# Patient Record
Sex: FEMALE | Race: WHITE | NOT HISPANIC OR LATINO | Employment: OTHER | URBAN - METROPOLITAN AREA
[De-identification: names, ages, dates, MRNs, and addresses within clinical notes are randomized per-mention and may not be internally consistent; named-entity substitution may affect disease eponyms.]

---

## 2017-12-05 ENCOUNTER — TRANSCRIBE ORDERS (OUTPATIENT)
Dept: ADMINISTRATIVE | Facility: HOSPITAL | Age: 62
End: 2017-12-05

## 2017-12-05 DIAGNOSIS — Z12.31 VISIT FOR SCREENING MAMMOGRAM: Primary | ICD-10-CM

## 2017-12-20 ENCOUNTER — APPOINTMENT (OUTPATIENT)
Dept: MAMMOGRAPHY | Facility: HOSPITAL | Age: 62
End: 2017-12-20
Attending: OBSTETRICS & GYNECOLOGY

## 2020-08-16 ENCOUNTER — APPOINTMENT (OUTPATIENT)
Dept: PREADMISSION TESTING | Facility: HOSPITAL | Age: 65
End: 2020-08-16

## 2020-09-29 ENCOUNTER — OFFICE (OUTPATIENT)
Dept: URBAN - METROPOLITAN AREA CLINIC 4 | Facility: CLINIC | Age: 65
End: 2020-09-29

## 2020-09-29 DIAGNOSIS — K21.9 GASTRO-ESOPHAGEAL REFLUX DISEASE WITHOUT ESOPHAGITIS: ICD-10-CM

## 2020-09-29 DIAGNOSIS — R14.0 ABDOMINAL DISTENSION (GASEOUS): ICD-10-CM

## 2020-09-29 DIAGNOSIS — K59.02 OUTLET DYSFUNCTION CONSTIPATION: ICD-10-CM

## 2020-09-29 DIAGNOSIS — K30 FUNCTIONAL DYSPEPSIA: ICD-10-CM

## 2020-09-29 DIAGNOSIS — R15.0 INCOMPLETE DEFECATION: ICD-10-CM

## 2020-09-29 PROCEDURE — 99203 OFFICE O/P NEW LOW 30 MIN: CPT | Performed by: INTERNAL MEDICINE

## 2020-12-21 ENCOUNTER — TRANSCRIBE ORDERS (OUTPATIENT)
Dept: ADMINISTRATIVE | Facility: HOSPITAL | Age: 65
End: 2020-12-21

## 2020-12-21 DIAGNOSIS — Z12.31 VISIT FOR SCREENING MAMMOGRAM: Primary | ICD-10-CM

## 2020-12-28 ENCOUNTER — APPOINTMENT (OUTPATIENT)
Dept: MAMMOGRAPHY | Facility: HOSPITAL | Age: 65
End: 2020-12-28

## 2021-02-04 ENCOUNTER — TELEHEALTH PROVIDED OTHER THAN IN PATIENT'S HOME (OUTPATIENT)
Dept: URBAN - METROPOLITAN AREA TELEHEALTH 1 | Facility: TELEHEALTH | Age: 66
End: 2021-02-04

## 2021-02-04 VITALS — HEIGHT: 64 IN | WEIGHT: 204 LBS

## 2021-02-04 DIAGNOSIS — R15.0 INCOMPLETE DEFECATION: ICD-10-CM

## 2021-02-04 DIAGNOSIS — K59.02 OUTLET DYSFUNCTION CONSTIPATION: ICD-10-CM

## 2021-02-04 DIAGNOSIS — R14.0 ABDOMINAL DISTENSION (GASEOUS): ICD-10-CM

## 2021-02-04 DIAGNOSIS — K30 FUNCTIONAL DYSPEPSIA: ICD-10-CM

## 2021-02-04 PROCEDURE — 99214 OFFICE O/P EST MOD 30 MIN: CPT | Mod: 95 | Performed by: NURSE PRACTITIONER

## 2021-02-04 RX ORDER — PSYLLIUM HUSK 3.4 G/12G
3.4 GRANULE ORAL
Qty: 1 | Refills: 11 | Status: ACTIVE

## 2021-02-04 RX ORDER — OMEPRAZOLE 20 MG/1
20 CAPSULE, DELAYED RELEASE ORAL
Qty: 30 | Refills: 11 | Status: ACTIVE

## 2021-02-04 RX ORDER — POLYETHYLENE GLYCOL 3350 17 G/17G
17 POWDER, FOR SOLUTION ORAL
Qty: 1 | Refills: 11 | Status: ACTIVE

## 2022-03-21 ENCOUNTER — APPOINTMENT (OUTPATIENT)
Dept: CT IMAGING | Facility: HOSPITAL | Age: 67
End: 2022-03-21

## 2022-03-21 ENCOUNTER — APPOINTMENT (OUTPATIENT)
Dept: GENERAL RADIOLOGY | Facility: HOSPITAL | Age: 67
End: 2022-03-21

## 2022-03-21 ENCOUNTER — HOSPITAL ENCOUNTER (EMERGENCY)
Facility: HOSPITAL | Age: 67
Discharge: HOME | End: 2022-03-21
Attending: EMERGENCY MEDICINE | Admitting: EMERGENCY MEDICINE

## 2022-03-21 VITALS
HEART RATE: 63 BPM | SYSTOLIC BLOOD PRESSURE: 140 MMHG | RESPIRATION RATE: 19 BRPM | WEIGHT: 206 LBS | TEMPERATURE: 97.9 F | HEIGHT: 64 IN | OXYGEN SATURATION: 96 % | BODY MASS INDEX: 35.17 KG/M2 | DIASTOLIC BLOOD PRESSURE: 71 MMHG

## 2022-03-21 DIAGNOSIS — R07.89 CHEST TIGHTNESS: ICD-10-CM

## 2022-03-21 DIAGNOSIS — R94.31 ABNORMAL EKG: ICD-10-CM

## 2022-03-21 DIAGNOSIS — R06.02 EXERTIONAL SHORTNESS OF BREATH: ICD-10-CM

## 2022-03-21 DIAGNOSIS — I49.3 PVC'S (PREMATURE VENTRICULAR CONTRACTIONS): ICD-10-CM

## 2022-03-21 DIAGNOSIS — R00.2 PALPITATIONS: Primary | ICD-10-CM

## 2022-03-21 LAB
ALBUMIN SERPL-MCNC: 4.8 G/DL (ref 3.5–5.2)
ALBUMIN/GLOB SERPL: 1.5 G/DL
ALP SERPL-CCNC: 116 U/L (ref 39–117)
ALT SERPL W P-5'-P-CCNC: 27 U/L (ref 1–33)
ANION GAP SERPL CALCULATED.3IONS-SCNC: 13 MMOL/L (ref 5–15)
AST SERPL-CCNC: 27 U/L (ref 1–32)
BASOPHILS # BLD AUTO: 0.12 10*3/MM3 (ref 0–0.2)
BASOPHILS NFR BLD AUTO: 1.1 % (ref 0–1.5)
BILIRUB SERPL-MCNC: 0.5 MG/DL (ref 0–1.2)
BUN SERPL-MCNC: 11 MG/DL (ref 8–23)
BUN/CREAT SERPL: 14.1 (ref 7–25)
CALCIUM SPEC-SCNC: 9.5 MG/DL (ref 8.6–10.5)
CHLORIDE SERPL-SCNC: 103 MMOL/L (ref 98–107)
CO2 SERPL-SCNC: 24 MMOL/L (ref 22–29)
CREAT SERPL-MCNC: 0.78 MG/DL (ref 0.57–1)
DEPRECATED RDW RBC AUTO: 42.7 FL (ref 37–54)
EGFRCR SERPLBLD CKD-EPI 2021: 83.9 ML/MIN/1.73
EOSINOPHIL # BLD AUTO: 0.16 10*3/MM3 (ref 0–0.4)
EOSINOPHIL NFR BLD AUTO: 1.4 % (ref 0.3–6.2)
ERYTHROCYTE [DISTWIDTH] IN BLOOD BY AUTOMATED COUNT: 13.7 % (ref 12.3–15.4)
FLUAV SUBTYP SPEC NAA+PROBE: NOT DETECTED
FLUBV RNA ISLT QL NAA+PROBE: NOT DETECTED
GLOBULIN UR ELPH-MCNC: 3.3 GM/DL
GLUCOSE SERPL-MCNC: 107 MG/DL (ref 65–99)
HCT VFR BLD AUTO: 48.3 % (ref 34–46.6)
HGB BLD-MCNC: 16 G/DL (ref 12–15.9)
HOLD SPECIMEN: NORMAL
IMM GRANULOCYTES # BLD AUTO: 0.08 10*3/MM3 (ref 0–0.05)
IMM GRANULOCYTES NFR BLD AUTO: 0.7 % (ref 0–0.5)
LIPASE SERPL-CCNC: 48 U/L (ref 13–60)
LYMPHOCYTES # BLD AUTO: 3.15 10*3/MM3 (ref 0.7–3.1)
LYMPHOCYTES NFR BLD AUTO: 28.1 % (ref 19.6–45.3)
MAGNESIUM SERPL-MCNC: 2.1 MG/DL (ref 1.6–2.4)
MCH RBC QN AUTO: 28.2 PG (ref 26.6–33)
MCHC RBC AUTO-ENTMCNC: 33.1 G/DL (ref 31.5–35.7)
MCV RBC AUTO: 85 FL (ref 79–97)
MONOCYTES # BLD AUTO: 0.7 10*3/MM3 (ref 0.1–0.9)
MONOCYTES NFR BLD AUTO: 6.2 % (ref 5–12)
NEUTROPHILS NFR BLD AUTO: 62.5 % (ref 42.7–76)
NEUTROPHILS NFR BLD AUTO: 7.01 10*3/MM3 (ref 1.7–7)
NRBC BLD AUTO-RTO: 0 /100 WBC (ref 0–0.2)
NT-PROBNP SERPL-MCNC: 135.8 PG/ML (ref 0–900)
PLATELET # BLD AUTO: 355 10*3/MM3 (ref 140–450)
PMV BLD AUTO: 8.8 FL (ref 6–12)
POTASSIUM SERPL-SCNC: 3.6 MMOL/L (ref 3.5–5.2)
PROT SERPL-MCNC: 8.1 G/DL (ref 6–8.5)
RBC # BLD AUTO: 5.68 10*6/MM3 (ref 3.77–5.28)
SARS-COV-2 RNA PNL SPEC NAA+PROBE: NOT DETECTED
SODIUM SERPL-SCNC: 140 MMOL/L (ref 136–145)
TROPONIN T SERPL-MCNC: <0.01 NG/ML (ref 0–0.03)
TROPONIN T SERPL-MCNC: <0.01 NG/ML (ref 0–0.03)
TSH SERPL DL<=0.05 MIU/L-ACNC: 2.29 UIU/ML (ref 0.27–4.2)
WBC NRBC COR # BLD: 11.22 10*3/MM3 (ref 3.4–10.8)
WHOLE BLOOD HOLD SPECIMEN: NORMAL
WHOLE BLOOD HOLD SPECIMEN: NORMAL

## 2022-03-21 PROCEDURE — 87636 SARSCOV2 & INF A&B AMP PRB: CPT | Performed by: PHYSICIAN ASSISTANT

## 2022-03-21 PROCEDURE — 93005 ELECTROCARDIOGRAM TRACING: CPT

## 2022-03-21 PROCEDURE — 71275 CT ANGIOGRAPHY CHEST: CPT

## 2022-03-21 PROCEDURE — 0 IOPAMIDOL PER 1 ML: Performed by: EMERGENCY MEDICINE

## 2022-03-21 PROCEDURE — 85025 COMPLETE CBC W/AUTO DIFF WBC: CPT

## 2022-03-21 PROCEDURE — 80053 COMPREHEN METABOLIC PANEL: CPT | Performed by: EMERGENCY MEDICINE

## 2022-03-21 PROCEDURE — 99283 EMERGENCY DEPT VISIT LOW MDM: CPT

## 2022-03-21 PROCEDURE — 84443 ASSAY THYROID STIM HORMONE: CPT | Performed by: PHYSICIAN ASSISTANT

## 2022-03-21 PROCEDURE — 84484 ASSAY OF TROPONIN QUANT: CPT | Performed by: EMERGENCY MEDICINE

## 2022-03-21 PROCEDURE — 71045 X-RAY EXAM CHEST 1 VIEW: CPT

## 2022-03-21 PROCEDURE — 83690 ASSAY OF LIPASE: CPT | Performed by: EMERGENCY MEDICINE

## 2022-03-21 PROCEDURE — 36415 COLL VENOUS BLD VENIPUNCTURE: CPT

## 2022-03-21 PROCEDURE — 93005 ELECTROCARDIOGRAM TRACING: CPT | Performed by: EMERGENCY MEDICINE

## 2022-03-21 PROCEDURE — 83880 ASSAY OF NATRIURETIC PEPTIDE: CPT | Performed by: EMERGENCY MEDICINE

## 2022-03-21 PROCEDURE — 83735 ASSAY OF MAGNESIUM: CPT | Performed by: PHYSICIAN ASSISTANT

## 2022-03-21 RX ORDER — PRAVASTATIN SODIUM 20 MG
20 TABLET ORAL DAILY
COMMUNITY
End: 2022-05-18 | Stop reason: SINTOL

## 2022-03-21 RX ORDER — AMLODIPINE BESYLATE 10 MG/1
10 TABLET ORAL DAILY
COMMUNITY
End: 2022-03-21

## 2022-03-21 RX ORDER — SODIUM CHLORIDE 0.9 % (FLUSH) 0.9 %
10 SYRINGE (ML) INJECTION AS NEEDED
Status: DISCONTINUED | OUTPATIENT
Start: 2022-03-21 | End: 2022-03-22 | Stop reason: HOSPADM

## 2022-03-21 RX ORDER — ASPIRIN 81 MG/1
324 TABLET, CHEWABLE ORAL ONCE
Status: DISCONTINUED | OUTPATIENT
Start: 2022-03-21 | End: 2022-03-21

## 2022-03-21 RX ORDER — OMEPRAZOLE 20 MG/1
20 CAPSULE, DELAYED RELEASE ORAL DAILY
COMMUNITY

## 2022-03-21 RX ORDER — ASPIRIN 81 MG/1
81 TABLET, CHEWABLE ORAL DAILY
COMMUNITY
End: 2022-05-18 | Stop reason: SDDI

## 2022-03-21 RX ADMIN — IOPAMIDOL 73 ML: 755 INJECTION, SOLUTION INTRAVENOUS at 21:14

## 2022-03-22 LAB
QT INTERVAL: 372 MS
QT INTERVAL: 400 MS
QTC INTERVAL: 434 MS
QTC INTERVAL: 437 MS

## 2022-03-22 NOTE — ED PROVIDER NOTES
"Subjective   Ms. Geiger is a very pleasant 67 yr old female who presents to the ED with complaints of chest \"tightness\" and \"flutters\", as well as some exertional shortness of breath.  The symptoms have been somewhat progressive over the past few weeks.  The pt notes she has a cough reflex when she feels the \"flutter\" in her chest.  She denies any associated nausea, vomiting or diaphoresis.  The pt saw her PCP recently and has been referred for echocardiogram and stress test next week in [place].  The pt notes that she has of PSVT that was treated successfully with cardiac ablation years ago.  She also has a history of hypertension, GERD, hyperlipidemia and pre-diabetes.  She is post-menopausal.  No hormonal supplements.  She has a family history of heart disease in her mother, who passed from an MI at age 61.  She is a non-smoker.  She consumes mild to moderate caffeine (sodas and chocolate).  No history of thyroid disease.   No recent decongestants or other stimulants.            Review of Systems   Constitutional: Negative for chills and fever.   HENT: Negative for sore throat.    Respiratory: Positive for chest tightness and shortness of breath (with exertion).    Cardiovascular: Positive for palpitations and leg swelling (bilateral).   Gastrointestinal: Negative for diarrhea, nausea and vomiting.   Genitourinary: Negative for dysuria.   Musculoskeletal: Negative for back pain.   Skin: Negative for pallor and rash.   Allergic/Immunologic: Negative for immunocompromised state.   Neurological: Negative for headaches.   Hematological: Negative.    Psychiatric/Behavioral: Negative.        Past Medical History:   Diagnosis Date   • Clostridium difficile infection     Remote apparent clostridium difficile infection with severe diarrhea and colonoscopy with fecal transplant, 2012.     • Hiatal hernia     - probable GERD syndrome.     • History of PSVT (paroxysmal supraventricular tachycardia)    • Hypertension  " Pt states that she was given vitamin D 2 and she took all the medication that was prescribed to her. Pt would like to know if the doctor would like her to continue taking the medication if so she would need a refill or should she stop the medication.  Pt wo       Allergies   Allergen Reactions   • Atorvastatin Myalgia   • Erythromycin GI Intolerance   • Phenergan [Promethazine] Confusion   • Voltaren [Diclofenac Sodium] GI Intolerance   • Penicillins Rash   • Sulfa Antibiotics Rash       Past Surgical History:   Procedure Laterality Date   • CARDIAC ABLATION     • DILATATION AND CURETTAGE      History of D&C x2       History reviewed. No pertinent family history.    Social History     Socioeconomic History   • Marital status:    Tobacco Use   • Smoking status: Never Smoker   • Smokeless tobacco: Never Used   Vaping Use   • Vaping Use: Never used   Substance and Sexual Activity   • Alcohol use: Never   • Drug use: Never   • Sexual activity: Defer           Objective   Physical Exam  Constitutional:       General: She is not in acute distress.     Appearance: Normal appearance.   HENT:      Head: Normocephalic.      Nose: Nose normal.      Mouth/Throat:      Mouth: Mucous membranes are moist.   Eyes:      General: No scleral icterus.     Conjunctiva/sclera: Conjunctivae normal.      Pupils: Pupils are equal, round, and reactive to light.   Cardiovascular:      Rate and Rhythm: Normal rate. Rhythm irregular.      Pulses: Normal pulses.      Comments: Occasional irregular beats with PVCs noted on monitor.  Pulmonary:      Effort: Pulmonary effort is normal. No respiratory distress.      Breath sounds: Normal breath sounds. No wheezing, rhonchi or rales.   Chest:      Chest wall: No tenderness.   Abdominal:      General: Bowel sounds are normal.      Tenderness: There is no abdominal tenderness. There is no guarding.   Musculoskeletal:         General: No tenderness. Normal range of motion.      Cervical back: Normal range of motion and neck supple.      Comments: 1+ bilateral LE edema     Skin:     General: Skin is warm and dry.   Neurological:      General: No focal deficit present.      Mental Status: She is alert and oriented to person, place, and time.  "  Psychiatric:         Mood and Affect: Mood normal.         Procedures       Differential diagnosis includes arrhythmia, MI, CHF, PE, COVID, angina, deconditioning, etc.    ED Course      Labs, EKG and CXR were ordered.  The pt's rhythm strip was monitored closely and she was noted to have occasional PVCs.  With each PVC, she had a reflexive cough and was aware of the palpitation.  She states this is the \"tightness and fluttering\" she has been having for several weeks now and is what brought her to the ED.    Her initial EKG shows a sinus rhythm with T abnormalities in the vikram-lateral leads.  I reviewed her old records and found an EKG from 10/17/2016 that showed the same T abnormalities, with no new changes today.      Her initial troponin is negative at <0.010.  Her BNP is normal.  No anemia.  COVID swab is negative.  TSH is normal.  Nml magnesium.      CXR:  Heart size and pulmonary vessels are within normal limits.  Lungs are  clear bilaterally.  No pleural effusion identified.  No evidence  pneumothorax.  Bony structures are unremarkable.    A CTA chest was ordered to r/o PE due to her SOA, cough and chest pain.    22:06 EDT  Second troponin remains negative at <0.010.      CTA chest:  1.  No evidence of pulmonary embolus or other acute process within the  chest.  2.  Cholelithiasis.  3.  Hepatic steatosis    The pt is resting comfortably.  She feels better after an explanation of the PVCs, etc.  She states her PCP just changed her meds and took her off amlodipine and prescribed nebivolol.  She states she has not started it yet.  This may very well suppress her PVCs.  She has f/u for stress test and echo in [place] but prefers to have it done here at our [facility].  I will make her referal.    I have encourage her to stop caffeine, etc.                                                            MDM    Final diagnoses:   Palpitations   PVC's (premature ventricular contractions)   Exertional " shortness of breath   Chest tightness   Abnormal EKG       ED Disposition  ED Disposition     ED Disposition   Discharge    Condition   Stable    Comment   --             [facility] CARDIOLOGY  [address]  [address]  [place] [id number] [phone]        [facility] Emergency Department  [address] [address] [phone]    If symptoms worsen         Medication List      Stop    amLODIPine 10 MG tablet  Commonly known as: Francesco Love, PA  03/21/22 2201

## 2022-03-22 NOTE — DISCHARGE INSTRUCTIONS
Rest.  Stop your amlodipine and take your nibivelol as prescribed by your doctor today.  Avoid caffeine or other stimulants.  Follow up with the [facility] (they should call you for time to come in for follow up).  Return to the ER if acutely worse.

## 2022-03-23 ENCOUNTER — OFFICE VISIT (OUTPATIENT)
Dept: CARDIOLOGY | Facility: HOSPITAL | Age: 67
End: 2022-03-23

## 2022-03-23 VITALS
WEIGHT: 208.38 LBS | BODY MASS INDEX: 35.58 KG/M2 | RESPIRATION RATE: 15 BRPM | HEIGHT: 64 IN | OXYGEN SATURATION: 95 % | SYSTOLIC BLOOD PRESSURE: 112 MMHG | HEART RATE: 60 BPM | DIASTOLIC BLOOD PRESSURE: 64 MMHG | TEMPERATURE: 98.2 F

## 2022-03-23 DIAGNOSIS — R07.2 PRECORDIAL PAIN: Primary | ICD-10-CM

## 2022-03-23 DIAGNOSIS — R09.89 LABILE HYPERTENSION: ICD-10-CM

## 2022-03-23 DIAGNOSIS — E78.5 DYSLIPIDEMIA: ICD-10-CM

## 2022-03-23 DIAGNOSIS — R94.31 ABNORMAL EKG: ICD-10-CM

## 2022-03-23 DIAGNOSIS — R53.83 OTHER FATIGUE: ICD-10-CM

## 2022-03-23 DIAGNOSIS — R06.09 DOE (DYSPNEA ON EXERTION): ICD-10-CM

## 2022-03-23 PROCEDURE — 99204 OFFICE O/P NEW MOD 45 MIN: CPT | Performed by: NURSE PRACTITIONER

## 2022-03-23 RX ORDER — NEBIVOLOL 10 MG/1
10 TABLET ORAL DAILY
COMMUNITY
Start: 2022-03-21

## 2022-03-25 NOTE — PROGRESS NOTES
"Chief Complaint  Establish Care and Chest Pain    Subjective    History of Present Illness {CC  Problem List  Visit  Diagnosis   Encounters  Notes  Medications  Labs  Result Review Imaging  Media :23}       History of Present Illness   67-year-old female presents the office today for ongoing evaluation of her chest pain.  She reports chest pain has been intermittent for the past few weeks.  She has associated dyspnea on exertion.  She has a history of hypertension hyperlipidemia , SVT with history of ablation.  She reports the pain is located in the left chest worsens with exertion and improves with rest.  She also notes dyspnea have been present for the past few weeks as well.  Objective     Vital Signs:   Vitals:    03/23/22 1508 03/23/22 1510 03/23/22 1511   BP: 114/65 110/62 112/64   BP Location: Right arm Left arm Left arm   Patient Position: Sitting Standing Sitting   Cuff Size: Adult Adult Adult   Pulse: 58 63 60   Resp:   15   Temp:   98.2 °F (36.8 °C)   TempSrc:   Temporal   SpO2: 94% 94% 95%   Weight:   94.5 kg (208 lb 6 oz)   Height:   162.6 cm (64\")     Body mass index is 35.77 kg/m².  Physical Exam  Vitals and nursing note reviewed.   Constitutional:       Appearance: Normal appearance.   HENT:      Head: Normocephalic.   Eyes:      Pupils: Pupils are equal, round, and reactive to light.   Cardiovascular:      Rate and Rhythm: Normal rate and regular rhythm.      Pulses: Normal pulses.      Heart sounds: Normal heart sounds. No murmur heard.  Pulmonary:      Effort: Pulmonary effort is normal.      Breath sounds: Normal breath sounds.   Abdominal:      General: Bowel sounds are normal.      Palpations: Abdomen is soft.   Musculoskeletal:         General: Normal range of motion.      Cervical back: Normal range of motion.      Right lower leg: No edema.      Left lower leg: No edema.   Skin:     General: Skin is warm and dry.      Capillary Refill: Capillary refill takes less than 2 seconds. "   Neurological:      Mental Status: She is alert and oriented to person, place, and time.   Psychiatric:         Mood and Affect: Mood normal.         Thought Content: Thought content normal.              Result Review  Data Reviewed:{ Labs  Result Review  Imaging  Med Tab  Media :23}   Lab Results   Component Value Date    GLUCOSE 107 (H) 03/21/2022    CALCIUM 9.5 03/21/2022     03/21/2022    K 3.6 03/21/2022    CO2 24.0 03/21/2022     03/21/2022    BUN 11 03/21/2022    CREATININE 0.78 03/21/2022    EGFRIFNONA 73 10/24/2016    BCR 14.1 03/21/2022    ANIONGAP 13.0 03/21/2022     Lab Results   Component Value Date    WBC 11.22 (H) 03/21/2022    HGB 16.0 (H) 03/21/2022    HCT 48.3 (H) 03/21/2022    MCV 85.0 03/21/2022     03/21/2022     Vent. Rate :  71 BPM     Atrial Rate :  71 BPM     P-R Int : 166 ms          QRS Dur :  96 ms      QT Int : 400 ms       P-R-T Axes :  65  61 -36 degrees     QTc Int : 434 ms     Sinus rhythm with occasional premature ventricular complexes  T wave abnormality, consider inferolateral ischemia  Abnormal ECG  When compared with ECG of 21-MAR-2022 19:14, (Unconfirmed)  premature ventricular complexes are now present  Confirmed by NATY ZHENG MD ([id number]) on 3/22/2022 8:46:07 PM     Referred By: ED MD           Confirmed By: NATY ZHENG MD      Blood Pressure :   */*   mmHG  Vent. Rate :  83 BPM     Atrial Rate :  83 BPM     P-R Int : 160 ms          QRS Dur :  94 ms      QT Int : 372 ms       P-R-T Axes :  69  72 -88 degrees     QTc Int : 437 ms     Normal sinus rhythm with sinus arrhythmia  ST & T wave abnormality, consider inferior ischemia  ST & T wave abnormality, consider anterolateral ischemia  Abnormal ECG  No previous ECGs available  Confirmed by NATY ZHENG MD ([id number]) on 3/22/2022 8:46:12 PM     Assessment and Plan {CC Problem List  Visit Diagnosis  ROS  Review (Popup)  Health Maintenance  Quality  BestPractice  Medications  SmartSets  SnapShot  Encounters  Media :23}   1. Precordial pain    - Adult Stress Echo W/ Cont or Stress Agent if Necessary Per Protocol; Future    2. PRO (dyspnea on exertion)    - Adult Stress Echo W/ Cont or Stress Agent if Necessary Per Protocol; Future    3. Other fatigue    - Adult Stress Echo W/ Cont or Stress Agent if Necessary Per Protocol; Future    4. Dyslipidemia  Stable on pravastatin  - Adult Stress Echo W/ Cont or Stress Agent if Necessary Per Protocol; Future    5. Labile hypertension  Stable on Bystolic  - Adult Stress Echo W/ Cont or Stress Agent if Necessary Per Protocol; Future    6. Abnormal EKG    - Adult Stress Echo W/ Cont or Stress Agent if Necessary Per Protocol; Future      Follow Up {Instructions Charge Capture  Follow-up Communications :23}   Return if symptoms worsen or fail to improve.    Patient was given instructions and counseling regarding her condition or for health maintenance advice. Please see specific information pulled into the AVS if appropriate.  Patient was instructed to call the [facility] with any questions, concerns, or worsening symptoms.

## 2022-03-30 ENCOUNTER — HOSPITAL ENCOUNTER (OUTPATIENT)
Dept: CARDIOLOGY | Facility: HOSPITAL | Age: 67
Discharge: HOME | End: 2022-03-30
Admitting: NURSE PRACTITIONER

## 2022-03-30 VITALS
HEIGHT: 64 IN | DIASTOLIC BLOOD PRESSURE: 70 MMHG | HEART RATE: 61 BPM | OXYGEN SATURATION: 96 % | BODY MASS INDEX: 35.17 KG/M2 | WEIGHT: 206 LBS | SYSTOLIC BLOOD PRESSURE: 110 MMHG

## 2022-03-30 DIAGNOSIS — R94.31 ABNORMAL EKG: ICD-10-CM

## 2022-03-30 DIAGNOSIS — R09.89 LABILE HYPERTENSION: ICD-10-CM

## 2022-03-30 DIAGNOSIS — R06.09 DOE (DYSPNEA ON EXERTION): ICD-10-CM

## 2022-03-30 DIAGNOSIS — R53.83 OTHER FATIGUE: ICD-10-CM

## 2022-03-30 DIAGNOSIS — E78.5 DYSLIPIDEMIA: ICD-10-CM

## 2022-03-30 DIAGNOSIS — R07.2 PRECORDIAL PAIN: ICD-10-CM

## 2022-03-30 LAB
BH CV ECHO MEAS - BSA(HAYCOCK): 2.1 M^2
BH CV ECHO MEAS - BSA: 2 M^2
BH CV ECHO MEAS - BZI_BMI: 35.4 KILOGRAMS/M^2
BH CV ECHO MEAS - BZI_METRIC_HEIGHT: 162.6 CM
BH CV ECHO MEAS - BZI_METRIC_WEIGHT: 93.4 KG
BH CV ECHO MEAS - EDV(CUBED): 98.9 ML
BH CV ECHO MEAS - EDV(MOD-SP4): 134 ML
BH CV ECHO MEAS - EDV(TEICH): 98.6 ML
BH CV ECHO MEAS - EF(CUBED): 62.2 %
BH CV ECHO MEAS - EF(MOD-SP4): 53.7 %
BH CV ECHO MEAS - EF(TEICH): 53.7 %
BH CV ECHO MEAS - ESV(CUBED): 37.4 ML
BH CV ECHO MEAS - ESV(MOD-SP4): 62 ML
BH CV ECHO MEAS - ESV(TEICH): 45.6 ML
BH CV ECHO MEAS - FS: 27.7 %
BH CV ECHO MEAS - IVS/LVPW: 0.92
BH CV ECHO MEAS - IVSD: 0.8 CM
BH CV ECHO MEAS - LA DIMENSION: 3.5 CM
BH CV ECHO MEAS - LV DIASTOLIC VOL/BSA (35-75): 67.7 ML/M^2
BH CV ECHO MEAS - LV MASS(C)D: 126.5 GRAMS
BH CV ECHO MEAS - LV MASS(C)DI: 63.9 GRAMS/M^2
BH CV ECHO MEAS - LV SYSTOLIC VOL/BSA (12-30): 31.3 ML/M^2
BH CV ECHO MEAS - LVIDD: 4.6 CM
BH CV ECHO MEAS - LVIDS: 3.3 CM
BH CV ECHO MEAS - LVLD AP4: 9.1 CM
BH CV ECHO MEAS - LVLS AP4: 8.2 CM
BH CV ECHO MEAS - LVPWD: 0.88 CM
BH CV ECHO MEAS - SI(CUBED): 31 ML/M^2
BH CV ECHO MEAS - SI(MOD-SP4): 36.3 ML/M^2
BH CV ECHO MEAS - SI(TEICH): 26.7 ML/M^2
BH CV ECHO MEAS - SV(CUBED): 61.5 ML
BH CV ECHO MEAS - SV(MOD-SP4): 72 ML
BH CV ECHO MEAS - SV(TEICH): 53 ML
BH CV STRESS BP STAGE 1: NORMAL
BH CV STRESS BP STAGE 2: NORMAL
BH CV STRESS DURATION MIN STAGE 1: 3
BH CV STRESS DURATION MIN STAGE 2: 3
BH CV STRESS DURATION MIN STAGE 3: 1
BH CV STRESS DURATION SEC STAGE 1: 0
BH CV STRESS DURATION SEC STAGE 2: 0
BH CV STRESS DURATION SEC STAGE 3: 26
BH CV STRESS GRADE STAGE 1: 10
BH CV STRESS GRADE STAGE 2: 12
BH CV STRESS GRADE STAGE 3: 14
BH CV STRESS HR STAGE 1: 86
BH CV STRESS HR STAGE 2: 100
BH CV STRESS HR STAGE 3: 120
BH CV STRESS METS STAGE 1: 5
BH CV STRESS METS STAGE 2: 7.5
BH CV STRESS METS STAGE 3: 10
BH CV STRESS O2 STAGE 1: 95
BH CV STRESS O2 STAGE 2: 94
BH CV STRESS O2 STAGE 3: 91
BH CV STRESS PROTOCOL 1: NORMAL
BH CV STRESS RECOVERY BP: NORMAL MMHG
BH CV STRESS RECOVERY HR: 71 BPM
BH CV STRESS RECOVERY O2: 96 %
BH CV STRESS SPEED STAGE 1: 1.7
BH CV STRESS SPEED STAGE 2: 2.5
BH CV STRESS SPEED STAGE 3: 3.4
BH CV STRESS STAGE 1: 1
BH CV STRESS STAGE 2: 2
BH CV STRESS STAGE 3: 3
BH CV VAS BP LEFT ARM: NORMAL MMHG
LV EF 2D ECHO EST: 55 %
MAXIMAL PREDICTED HEART RATE: 154 BPM
PERCENT MAX PREDICTED HR: 81.17 %
STRESS BASELINE BP: NORMAL MMHG
STRESS BASELINE HR: 61 BPM
STRESS O2 SAT REST: 96 %
STRESS PERCENT HR: 95 %
STRESS POST ESTIMATED WORKLOAD: 9.1 METS
STRESS POST EXERCISE DUR MIN: 7 MIN
STRESS POST EXERCISE DUR SEC: 26 SEC
STRESS POST O2 SAT PEAK: 91 %
STRESS POST PEAK BP: NORMAL MMHG
STRESS POST PEAK HR: 125 BPM
STRESS TARGET HR: 131 BPM

## 2022-03-30 PROCEDURE — 93352 ADMIN ECG CONTRAST AGENT: CPT | Performed by: INTERNAL MEDICINE

## 2022-03-30 PROCEDURE — 93350 STRESS TTE ONLY: CPT

## 2022-03-30 PROCEDURE — 93350 STRESS TTE ONLY: CPT | Performed by: INTERNAL MEDICINE

## 2022-03-30 PROCEDURE — 93017 CV STRESS TEST TRACING ONLY: CPT

## 2022-03-30 PROCEDURE — 93018 CV STRESS TEST I&R ONLY: CPT | Performed by: INTERNAL MEDICINE

## 2022-03-30 PROCEDURE — 25010000002 SULFUR HEXAFLUORIDE MICROSPH 60.7-25 MG RECONSTITUTED SUSPENSION: Performed by: NURSE PRACTITIONER

## 2022-03-30 RX ADMIN — SULFUR HEXAFLUORIDE 5 ML: KIT at 15:20

## 2022-03-31 ENCOUNTER — TELEPHONE (OUTPATIENT)
Dept: CARDIOLOGY | Facility: HOSPITAL | Age: 67
End: 2022-03-31

## 2022-03-31 NOTE — TELEPHONE ENCOUNTER
"Called patient with stress test results. Stress echo appeared negative for ischemia but post exercise images were suboptimal. She notes exertional dyspnea and cough. She reports occasional chest soreness and pressure. She says symptoms feel almost like a \"panic attack\". She says she is feeling better. We discussed doing a stress PET/coronary CTA and possibly PFTs due to exertional dyspnea/cough. She would like me to discuss this with Bushra ANTONIO. Will discuss with her and reach out to her with further plans.   "

## 2022-04-04 ENCOUNTER — TELEPHONE (OUTPATIENT)
Dept: CARDIOLOGY | Facility: HOSPITAL | Age: 67
End: 2022-04-04

## 2022-04-04 DIAGNOSIS — R09.89 LABILE HYPERTENSION: ICD-10-CM

## 2022-04-04 DIAGNOSIS — R53.83 OTHER FATIGUE: ICD-10-CM

## 2022-04-04 DIAGNOSIS — R06.09 DOE (DYSPNEA ON EXERTION): ICD-10-CM

## 2022-04-04 DIAGNOSIS — R94.39 ABNORMAL STRESS TEST: ICD-10-CM

## 2022-04-04 DIAGNOSIS — R07.2 PRECORDIAL PAIN: ICD-10-CM

## 2022-04-04 DIAGNOSIS — E78.5 DYSLIPIDEMIA: Primary | ICD-10-CM

## 2022-04-15 PROCEDURE — U0004 COV-19 TEST NON-CDC HGH THRU: HCPCS | Performed by: FAMILY MEDICINE

## 2022-04-18 ENCOUNTER — APPOINTMENT (OUTPATIENT)
Dept: PULMONOLOGY | Facility: HOSPITAL | Age: 67
End: 2022-04-18

## 2022-04-18 ENCOUNTER — HOSPITAL ENCOUNTER (OUTPATIENT)
Dept: PULMONOLOGY | Facility: HOSPITAL | Age: 67
Discharge: HOME | End: 2022-04-18
Admitting: NURSE PRACTITIONER

## 2022-04-18 DIAGNOSIS — R06.09 DOE (DYSPNEA ON EXERTION): ICD-10-CM

## 2022-04-18 PROCEDURE — 94727 GAS DIL/WSHOT DETER LNG VOL: CPT | Performed by: INTERNAL MEDICINE

## 2022-04-18 PROCEDURE — 94010 BREATHING CAPACITY TEST: CPT | Performed by: INTERNAL MEDICINE

## 2022-04-18 PROCEDURE — 94727 GAS DIL/WSHOT DETER LNG VOL: CPT

## 2022-04-18 PROCEDURE — 94729 DIFFUSING CAPACITY: CPT

## 2022-04-18 PROCEDURE — 94010 BREATHING CAPACITY TEST: CPT

## 2022-04-18 PROCEDURE — 94729 DIFFUSING CAPACITY: CPT | Performed by: INTERNAL MEDICINE

## 2022-05-06 ENCOUNTER — HOSPITAL ENCOUNTER (OUTPATIENT)
Dept: CT IMAGING | Facility: HOSPITAL | Age: 67
Discharge: HOME | End: 2022-05-06
Admitting: NURSE PRACTITIONER

## 2022-05-06 VITALS
HEART RATE: 55 BPM | OXYGEN SATURATION: 96 % | WEIGHT: 211 LBS | DIASTOLIC BLOOD PRESSURE: 81 MMHG | TEMPERATURE: 97.5 F | HEIGHT: 64 IN | RESPIRATION RATE: 18 BRPM | BODY MASS INDEX: 36.02 KG/M2 | SYSTOLIC BLOOD PRESSURE: 130 MMHG

## 2022-05-06 DIAGNOSIS — R53.83 OTHER FATIGUE: ICD-10-CM

## 2022-05-06 DIAGNOSIS — R07.2 PRECORDIAL PAIN: ICD-10-CM

## 2022-05-06 DIAGNOSIS — R09.89 LABILE HYPERTENSION: ICD-10-CM

## 2022-05-06 DIAGNOSIS — E78.5 DYSLIPIDEMIA: ICD-10-CM

## 2022-05-06 DIAGNOSIS — R94.39 ABNORMAL STRESS TEST: ICD-10-CM

## 2022-05-06 DIAGNOSIS — R06.09 DOE (DYSPNEA ON EXERTION): ICD-10-CM

## 2022-05-06 PROCEDURE — 75574 CT ANGIO HRT W/3D IMAGE: CPT

## 2022-05-06 PROCEDURE — 82565 ASSAY OF CREATININE: CPT

## 2022-05-06 PROCEDURE — 0 IOPAMIDOL PER 1 ML: Performed by: NURSE PRACTITIONER

## 2022-05-06 RX ORDER — METOPROLOL TARTRATE 50 MG/1
100 TABLET, FILM COATED ORAL ONCE AS NEEDED
Status: CANCELLED | OUTPATIENT
Start: 2022-05-06

## 2022-05-06 RX ORDER — SODIUM CHLORIDE 0.9 % (FLUSH) 0.9 %
3 SYRINGE (ML) INJECTION EVERY 12 HOURS SCHEDULED
Status: CANCELLED | OUTPATIENT
Start: 2022-05-06

## 2022-05-06 RX ORDER — SODIUM CHLORIDE 0.9 % (FLUSH) 0.9 %
10 SYRINGE (ML) INJECTION AS NEEDED
Status: CANCELLED | OUTPATIENT
Start: 2022-05-06

## 2022-05-06 RX ORDER — NITROGLYCERIN 0.4 MG/1
TABLET SUBLINGUAL
Status: COMPLETED
Start: 2022-05-06 | End: 2022-05-06

## 2022-05-06 RX ORDER — METOPROLOL TARTRATE 50 MG/1
50 TABLET, FILM COATED ORAL ONCE AS NEEDED
Status: CANCELLED | OUTPATIENT
Start: 2022-05-06

## 2022-05-06 RX ORDER — LIDOCAINE HYDROCHLORIDE 10 MG/ML
5 INJECTION, SOLUTION EPIDURAL; INFILTRATION; INTRACAUDAL; PERINEURAL AS NEEDED
Status: CANCELLED | OUTPATIENT
Start: 2022-05-06

## 2022-05-06 RX ORDER — FENTANYL CITRATE 50 UG/ML
INJECTION, SOLUTION INTRAMUSCULAR; INTRAVENOUS
Status: DISCONTINUED
Start: 2022-05-06 | End: 2022-05-06 | Stop reason: WASHOUT

## 2022-05-06 RX ORDER — NITROGLYCERIN 0.4 MG/1
0.4 TABLET SUBLINGUAL
Status: CANCELLED | OUTPATIENT
Start: 2022-05-06 | End: 2022-05-06

## 2022-05-06 RX ORDER — MIDAZOLAM HYDROCHLORIDE 1 MG/ML
INJECTION INTRAMUSCULAR; INTRAVENOUS
Status: DISCONTINUED
Start: 2022-05-06 | End: 2022-05-06 | Stop reason: WASHOUT

## 2022-05-06 RX ADMIN — IOPAMIDOL 65 ML: 755 INJECTION, SOLUTION INTRAVENOUS at 09:18

## 2022-05-06 RX ADMIN — NITROGLYCERIN 0.4 MG: 0.4 TABLET SUBLINGUAL at 09:12

## 2022-05-10 LAB — CREAT BLDA-MCNC: 0.9 MG/DL (ref 0.6–1.3)

## 2022-05-11 NOTE — PROGRESS NOTES
Encounter Date:05/18/2022    Location: [facility]    Silvia Alvarenga    Patient ID: Malia Geiger is a 67 y.o. 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    1955  Subjective:      Chief Complaint/Reason for visit:    Chief Complaint   Patient presents with   • Palpitations       Problem List:  1. Chronic intermittent chest pain syndrome of uncertain etiology.  a. Remote abnormal echocardiogram with moderate MR, NYHA class II-III exertional dyspnea and fatigue syndrome with intermittent atypical chest pain, September 2002.  b. Abnormal EKG with recurrent symptoms and acceptable quantitative SPECT gated Cardiolite GXT (LVEF 0.60), November 2002.  c. Recent CCS class II chest pain syndrome with NYHA class II exertional dyspnea and fatigue syndrome.  d. Abnormal acceptable echo GXT, 03/28/2014.   e. CCS class I/NYHA Class II PRO symptoms.   f. Recent symptoms of dyspnea/cough/chest pain  g. Stress Echo 3/30/22, KEATON Guzman MD revealing normal LVEF, no valvular abnormalities,no echographic evidence of ischemia, post stress echo pictures were sub-optimal.  h. CT Calcium Score 5/6/22- Score 0  2. PSVT.   a. History of documented PSVT, June 2005.   b. Normal echocardiogram, July 2010.  c. EP study and radiofrequency ablation recommended and scheduled on several occasions with patient deferral, 2010.   d. Paroxysmal SVT with ER presentation, IV adenosine cardioversion, June 2011.  e. Recurrent symptomatic SVT, 2014.  f. EP study with radiofrequency ablation of the AV node and of the coronary sinus ostium by Dr. Tse, 06/17/2014.  g. History of palpitations 3/2022 with associated shortness of breath, back pain,   h. EKG with ED visit revealing PVC's  3. Moderate obesity (BMI 35.4), remote Phen-Fen use and contemplated weight loss program participation.  4. Dyslipidemia.  5. Labile hypertension.  6. Remote apparent clostridium difficile infection with severe diarrhea and colonoscopy with  fecal transplant, 2012.   7. History of Hiatal hernia - probable GERD syndrome.   8. History of D&C x2.   9. Obesity- BMI 35.70  10. Probable Sleep Apnea       A. Recent sleep study, results pending.      HPI: Mrs. Geiger is a 67 yr old white female with the above noted medical history who presents in consultation today at the request of Dr. Alvarenga for further evaluation of her palpitations.  She states that in early March she was experiencing palpitations on a regular basis with associated symptoms of upper back pain, shortness of breath on exertion and upper abdominal pain.  She was seen in the ED and noted to have PVC's.  She was started on Bystolic 10 mg po daily and her symptoms have resolved.  She underwent a normal Stress echo and had a Calcium score of 0. She has noted muscle aches in her legs since starting Pravastatin.    Social History     Socioeconomic History   • Marital status:    Tobacco Use   • Smoking status: Never Smoker   • Smokeless tobacco: Never Used   Vaping Use   • Vaping Use: Never used   Substance and Sexual Activity   • Alcohol use: Never   • Drug use: Never   • Sexual activity: Defer       family history includes Cancer in her maternal grandmother; Diabetes in her mother; Heart attack in her father and mother; Hyperlipidemia in her mother; Hypertension in her mother; Leukemia in her maternal grandfather; No Known Problems in her paternal grandfather and paternal grandmother.     has a past medical history of Arrhythmia, Arthritis, Atrial fibrillation (HCC), Chest pain, Chicken pox, Clostridium difficile infection, Colitis, Diabetes mellitus (HCC), Fatty liver, GERD (gastroesophageal reflux disease), Heart murmur, Hiatal hernia, History of echocardiogram, History of PSVT (paroxysmal supraventricular tachycardia), History of stress test, Hyperlipidemia, Hypertension, Measles, and Menopause.    Allergies   Allergen Reactions   • Atorvastatin Myalgia   • Erythromycin GI Intolerance   •  "Lisinopril Cough   • Phenergan [Promethazine] Confusion   • Voltaren [Diclofenac Sodium] GI Intolerance   • Penicillins Rash   • Sulfa Antibiotics Rash         Current Outpatient Medications:   •  metFORMIN (GLUCOPHAGE) 500 MG tablet, Take 500 mg by mouth Daily With Breakfast., Disp: , Rfl:   •  nebivolol (BYSTOLIC) 10 MG tablet, Take 10 mg by mouth Daily., Disp: , Rfl:   •  omeprazole (priLOSEC) 20 MG capsule, Take 20 mg by mouth Daily., Disp: , Rfl:   •  pravastatin (PRAVACHOL) 20 MG tablet, Take 20 mg by mouth Daily., Disp: , Rfl:   •  Probiotic Product (PROBIOTIC ADVANCED PO), Take  by mouth., Disp: , Rfl:   •  vitamin D3 125 MCG (5000 UT) capsule capsule, Take 5,000 Units by mouth Daily., Disp: , Rfl:     Review of Systems   Constitutional: Negative.   HENT: Negative.    Eyes: Positive for blurred vision and visual disturbance.   Cardiovascular: Positive for palpitations.   Respiratory: Positive for sleep disturbances due to breathing and snoring.    Endocrine: Negative.    Hematologic/Lymphatic: Negative.    Skin: Positive for rash.   Musculoskeletal: Positive for arthritis and joint pain.   Gastrointestinal: Positive for abdominal pain.   Neurological: Negative.    Psychiatric/Behavioral: Negative.    Allergic/Immunologic: Negative.        Vitals:    05/18/22 1001   BP: 132/80   Pulse: 54   SpO2: 97%   Weight: 94.3 kg (208 lb)   Height: 162.6 cm (64\")     Objective:       Vitals reviewed.   Constitutional:       Appearance: Healthy appearance. Not in distress.   HENT:      Nose: Nose normal.    Mouth/Throat:      Pharynx: Oropharynx is clear.   Pulmonary:      Effort: Pulmonary effort is normal.      Breath sounds: Normal breath sounds.   Cardiovascular:      PMI at left midclavicular line. Normal rate. Regular rhythm.      Murmurs: There is no murmur.      No gallop. No click. No rub.   Abdominal:      General: Bowel sounds are normal.      Palpations: Abdomen is soft.   Musculoskeletal: Normal range of " motion.      Cervical back: Normal range of motion and neck supple. Skin:     General: Skin is warm and dry.   Neurological:      General: No focal deficit present.      Mental Status: Alert and oriented to person, place and time.         Data Review:     ECG 12 Lead    Date/Time: 5/18/2022 10:37 AM  Performed by: Darrin Guzman MD  Authorized by: Darrin Guzman MD   Comparison: not compared with previous ECG   Previous ECG: no previous ECG available  Rhythm: sinus bradycardia  Rate: bradycardic  BPM: 54  Other findings: T wave abnormality    Clinical impression: abnormal EKG         Stress Echo 3/30/22  · Estimated left ventricular EF = 55%  · The cardiac valves are anatomically and functionally normal.  · Nondiagnostic EKG response to exercise due to baseline abnormalities  · No echocardiographic evidence of significant ischemia seen. However post exercise images were somewhat suboptimal.          Assessment:      Diagnosis Plan   1. Palpitations  Currently well controlled with the addition of Bystolic   2. PVC (premature ventricular contraction)  None noted on EKG today   3. Moderate obesity  Diet and Exercise   4. ANTHONY (obstructive sleep apnea)  Follow- up with sleep medicine     Plan:   1. Continue Bystolic for palpitations/PVC's.  2. May discontinue Pravastatin due to myalgias.  And calcium score of 0 (very low short-term cardiovascular risk)  3. Diet and Exercise.  4. Follow up as needed.    Scribed for Darrin Guzman MD by Linda Mcmahon RN. 5/18/2022  12:35 EDT      I have seen and examined the patient, I have reviewed the note, discussed the case with the advance practice clinician, made necessary changes and I agree with the final note.    Darrin Guzman MD  05/18/22  12:35 EDT          Please note that portions of this note may have been completed with a voice recognition program. Efforts were made to edit the dictations, but occasionally words are mistranscribed.

## 2022-05-18 ENCOUNTER — OFFICE VISIT (OUTPATIENT)
Dept: CARDIOLOGY | Facility: CLINIC | Age: 67
End: 2022-05-18

## 2022-05-18 VITALS
OXYGEN SATURATION: 97 % | HEART RATE: 54 BPM | WEIGHT: 208 LBS | BODY MASS INDEX: 35.51 KG/M2 | DIASTOLIC BLOOD PRESSURE: 80 MMHG | SYSTOLIC BLOOD PRESSURE: 132 MMHG | HEIGHT: 64 IN

## 2022-05-18 DIAGNOSIS — E66.8 MODERATE OBESITY: ICD-10-CM

## 2022-05-18 DIAGNOSIS — G47.33 OSA (OBSTRUCTIVE SLEEP APNEA): ICD-10-CM

## 2022-05-18 DIAGNOSIS — I49.3 PVC (PREMATURE VENTRICULAR CONTRACTION): ICD-10-CM

## 2022-05-18 DIAGNOSIS — R00.2 PALPITATIONS: Primary | ICD-10-CM

## 2022-05-18 PROCEDURE — 93000 ELECTROCARDIOGRAM COMPLETE: CPT | Performed by: INTERNAL MEDICINE

## 2022-05-18 PROCEDURE — 99213 OFFICE O/P EST LOW 20 MIN: CPT | Performed by: INTERNAL MEDICINE

## 2022-06-27 ENCOUNTER — TELEPHONE (OUTPATIENT)
Dept: OBSTETRICS AND GYNECOLOGY | Facility: CLINIC | Age: 67
End: 2022-06-27

## 2022-06-27 NOTE — TELEPHONE ENCOUNTER
Pt is PMB and is stating she has been bleeding light pink then all of a sudden dark red all in a days time.    Would like to speak to nurse.    Please advise

## 2022-06-27 NOTE — TELEPHONE ENCOUNTER
Patient reports that she had some minimal dark red bleeding over the weekend and it has lightened in color today. Patient advised to come in and be evaluated. Appt made. Patient V/U.

## 2022-07-01 ENCOUNTER — OFFICE VISIT (OUTPATIENT)
Dept: OBSTETRICS AND GYNECOLOGY | Facility: CLINIC | Age: 67
End: 2022-07-01

## 2022-07-01 VITALS — WEIGHT: 208.4 LBS | DIASTOLIC BLOOD PRESSURE: 82 MMHG | SYSTOLIC BLOOD PRESSURE: 124 MMHG | BODY MASS INDEX: 35.77 KG/M2

## 2022-07-01 DIAGNOSIS — N95.0 POSTMENOPAUSAL BLEEDING: ICD-10-CM

## 2022-07-01 DIAGNOSIS — N95.0 PMB (POSTMENOPAUSAL BLEEDING): Primary | ICD-10-CM

## 2022-07-01 DIAGNOSIS — Z01.419 WOMEN'S ANNUAL ROUTINE GYNECOLOGICAL EXAMINATION: Primary | ICD-10-CM

## 2022-07-01 PROBLEM — Z76.89 ENCOUNTER FOR BIOPSY: Status: ACTIVE | Noted: 2022-07-01

## 2022-07-01 PROCEDURE — 58100 BIOPSY OF UTERUS LINING: CPT | Performed by: OBSTETRICS & GYNECOLOGY

## 2022-07-01 PROCEDURE — 99387 INIT PM E/M NEW PAT 65+ YRS: CPT | Performed by: OBSTETRICS & GYNECOLOGY

## 2022-07-01 RX ORDER — KETOCONAZOLE 20 MG/G
CREAM TOPICAL
COMMUNITY
Start: 2022-06-28

## 2022-07-01 RX ORDER — METFORMIN HYDROCHLORIDE 500 MG/1
TABLET, EXTENDED RELEASE ORAL
COMMUNITY
Start: 2022-04-26

## 2022-07-01 NOTE — PROGRESS NOTES
Chief Complaint   Patient presents with   • Establish Care     Post menopausal bleeding   GYN Annual Exam       Subjective   HPI      Malia Geiger is a 67 y.o. female, , who presents with initial evaluation of Postmenopausal bleeding.  She is also due for annual exam.    She states she has experienced this problem for a while. She reports she has had some very light pink spots every now and then when she wipes but on 2022 she started having some dark red vaginal bleeding. She states she did pass 1 clot and has noticed a vaginal odor. She denies any bleeding for the last 2 days. She was seen by her PCP on 2022 to rule out if this was related to a UTI. She states that the problem is worsening. The patient reports additional symptoms as none.  The patient has  been evaluated:  with TVUS, performed 2022, result ET 9.1mm; otherwise normal.  In the past the patient has tried none. Partner Status: Marital Status: .  New Partners since last visit: no.      Additional OB/GYN History   Current contraception: contraceptive methods: Post menopausal status    Last Pap : 2-3 years ago, normal   Last Completed Pap Smear     This patient has no relevant Health Maintenance data.        History of abnormal Pap smear: no  MGM -  of female Ca in 70s  Father lymphoma nonhodgkins  MGF - leukemia  - 60     Last mammogram: 2021- [facility]   Last Completed Mammogram     This patient has no relevant Health Maintenance data.        Last colonoscopy: less than 10yr, neg  Last Completed Colonoscopy     This patient has no relevant Health Maintenance data.        Last DEXA: - normal      Tobacco Usage?: No   OB History        3    Para   3    Term                AB        Living           SAB        IAB        Ectopic        Molar        Multiple        Live Births                    Health Maintenance   Topic Date Due   • DXA SCAN  Never done   • COLORECTAL CANCER SCREENING   Never done   • ZOSTER VACCINE (1 of 2) Never done   • MAMMOGRAM  06/03/2017   • Pneumococcal Vaccine 65+ (1 - PCV) Never done   • HEPATITIS C SCREENING  Never done   • ANNUAL WELLNESS VISIT  Never done   • COVID-19 Vaccine (4 - Booster for Moderna series) 05/04/2022   • LIPID PANEL  05/18/2022   • INFLUENZA VACCINE  10/01/2022   • TDAP/TD VACCINES (2 - Td or Tdap) 07/14/2030       The additional following portions of the patient's history were reviewed and updated as appropriate: allergies, current medications, past family history, past medical history, past social history, past surgical history and problem list.    I have reviewed and agree with the HPI, ROS, and historical information as entered above. Melissa Bustos MD    Objective   /82   Wt 94.5 kg (208 lb 6.4 oz)   BMI 35.77 kg/m²     Physical Exam    General:  well developed; obese, no acute distress  Skin:  No suspicious lesions seen  Thyroid: normal to inspection and palpation  Breasts:  Examined in supine position  Symmetric without masses or skin dimpling  Nipples normal without inversion, lesions or discharge  There are no palpable axillary nodes  CVS: RRR, no M/R/G, distal pulses wnl  Resp: CTAB, No W/R/R  Abdomen: soft, non-tender; no masses  no umbilical or inguinal hernias are present  no hepato-splenomegaly  Pelvis: Clinical staff was present for exam  External genitalia:  normal appearance of the external genitalia including Bartholin's and Conner's glands.  Urethra: no masses or tenderness  Urethral meatus: normal size;  No lesions or signs of prolapse  Bladder: non tender to palpation, no masses, no prolapse  Vagina: atrophic mucosa without prolapse or lesions.  Cervix:  normal appearance.  Uterus:  normal size, shape and consistency.  Adnexa:  normal bimanual exam of the adnexa.  Perineum/Anus: normal appearance, no external hemorrhoids  Ext: 2+ pulses, no edema    Assessment & Plan     Diagnoses and all orders for this visit:    1.  Women's annual routine gynecological examination (Primary)  -     LIQUID-BASED PAP SMEAR, [facility] ([facility])    2. Postmenopausal bleeding  -     TISSUE EXAM, [facility] ([facility])        Plan     Lab(s) Ordered  Call for heavy bleeding  EMB performed.  We will call with results  Return PRN   Return in about 1 year (around 7/1/2023) for Annual physical.   Pap screening guidelines reviewed  Pap with HPV done  Up to date on colonoscopy, dexa, mammogram  Reviewed monthly self breast exams.  Instructed to call with lumps, pain, or breast discharge.    Reviewed exercise and healthy diet as a preventative health measures.       Melissa Bustos MD  07/01/2022

## 2022-07-01 NOTE — PROGRESS NOTES
EMB Performed  Procedures    After the indications, risks, benefits, and alternatives to performing and endometrial biopsy were explained to the patient, opted to move forward with the endometrial biopsy. A urine pregnancy was not done    PROCEDURE:  The patient was placed on the table in the supine lithotomy position.  She was draped in the appropriate manner.  A speculum was placed in the vagina.  The cervix was visualized and prepped with Betadine. A tenaculum was placed. A small plastic 5 mm Pipelle syringe curette was inserted into the cervical canal.  The uterus was sounded to 7 cms.  A vigorous four quadrant biopsy was performed, removing a small amount of tissue.  This tissue was placed in Formalin and sent to pathology.  The patient tolerated the procedure fairly well and reported Severe cramping.  She had Mild cramping at the time of discharge.    Problem List Items Addressed This Visit     Encounter for biopsy    Women's annual routine gynecological examination - Primary    Relevant Orders    LIQUID-BASED PAP SMEAR, [facility] ([facility])          1. Lab(s) Ordered  2. Call the office in 5 business days for biopsy results.  3. Patient instructed to call the office if develops a fever of 100.4 or greater, vaginal bleeding heavier than a period, foul vaginal discharge or pain.    Melissa Bustos MD  07/01/2022

## 2022-07-06 ENCOUNTER — TELEPHONE (OUTPATIENT)
Dept: OBSTETRICS AND GYNECOLOGY | Facility: CLINIC | Age: 67
End: 2022-07-06

## 2022-07-06 LAB
REF LAB TEST METHOD: NORMAL
REF LAB TEST METHOD: NORMAL

## 2022-07-15 ENCOUNTER — TELEPHONE (OUTPATIENT)
Dept: OBSTETRICS AND GYNECOLOGY | Facility: CLINIC | Age: 67
End: 2022-07-15

## 2022-07-15 NOTE — TELEPHONE ENCOUNTER
I signed off on them already.  They were benign.  I'm fine if you give neg test results to patients.  Thanks

## 2022-08-10 ENCOUNTER — TELEPHONE (OUTPATIENT)
Dept: OBSTETRICS AND GYNECOLOGY | Facility: CLINIC | Age: 67
End: 2022-08-10

## 2022-08-10 NOTE — TELEPHONE ENCOUNTER
Pt stopped in and requested to speak with a nurse.     Called pt, she expresses concerns over her PMB she had in 6/2022. BETSY did a TVUS, EMB and a pap on 7/1/22. EMB and pap were normal. Pt notified of results 7/15/22.    Pt states she is concerned that she had PMB and has no explanation as to why. Discussed notes from visit with BETSY on 7/1/2022. Pt had questions about what a 9 meant, found in notes that it stated her endometrial lining was 9.1mm, discussed with pt. Let her know this why the EMB was done. Pt verbalizes understanding. Pt expresses concerns with no explanation and requested her records from medical records and did not receive her U/S results in those records. Pt denies vaginal bleeding at this time and denies additional gynecological issues at this time.      Offered pt a F/U appt with BETSY to discuss her concerns, pt denies she would like to schedule an appt at this time.     Pt states she has a back ache that has been constant for a few months and she is going to F/U with her PCP concerning this.     Instructed pt to call back if she experiences bleeding again, other gynecological concerns, or has other questions concerning this matter. Pt verbalizes understanding.

## 2023-10-25 ENCOUNTER — TELEPHONE (OUTPATIENT)
Dept: CARDIOLOGY | Facility: CLINIC | Age: 68
End: 2023-10-25
Payer: MEDICARE

## 2023-10-25 NOTE — TELEPHONE ENCOUNTER
Caller: Malia Geiger    Relationship to patient: Self    Best call back number: [phone]    Chief complaint: PT BP GETS GOING HIGH AND PCP KEEPS RAISING HER DOSE BUT IT HASN'T HELPED.     Type of visit: FOLLOW UP    Requested date: ASAP     If rescheduling, when is the original appointment:      Additional notes:

## 2023-11-16 ENCOUNTER — TELEPHONE (OUTPATIENT)
Dept: OBSTETRICS AND GYNECOLOGY | Facility: CLINIC | Age: 68
End: 2023-11-16
Payer: MEDICARE

## 2023-11-16 ENCOUNTER — OFFICE VISIT (OUTPATIENT)
Dept: OBSTETRICS AND GYNECOLOGY | Facility: CLINIC | Age: 68
End: 2023-11-16
Payer: MEDICARE

## 2023-11-16 VITALS
SYSTOLIC BLOOD PRESSURE: 110 MMHG | DIASTOLIC BLOOD PRESSURE: 80 MMHG | BODY MASS INDEX: 34.49 KG/M2 | WEIGHT: 202 LBS | HEIGHT: 64 IN

## 2023-11-16 DIAGNOSIS — N94.89 ENDOMETRIAL MASS: Primary | ICD-10-CM

## 2023-11-16 NOTE — PROGRESS NOTES
Chief Complaint   Patient presents with    possible mass       Subjective   HPI  Malia Geiger is a 68 y.o. female, . Her last LMP was No LMP recorded. Patient is postmenopausal.. who presents for follow up on Endometrial mass.      At her last visit she was treated with  denies . Since then she reports her symptoms/issue has remained unchanged. The patient reports additional symptoms as none.      States she's ok with a hysterectomy to not have to worry about it.     - Ov Ca screening program -   5/3/23 - [facility] sent     EMB last year done for postmenopausal bleeding, endometrial thickening benign.  Patient hasn't had bleeding since last year.    Additional OB/GYN History     Last Pap : 2022 - normal, neg HPV  EMB at that time benign.    Last Completed Pap Smear       This patient has no relevant Health Maintenance data.            Last mammogram:   Last Completed Mammogram       This patient has no relevant Health Maintenance data.            Tobacco Usage?: No   OB History          3    Para   3    Term                AB        Living             SAB        IAB        Ectopic        Molar        Multiple        Live Births                      Current Outpatient Medications:     ciclopirox (PENLAC) 8 % solution, APPLY TWICE A DAY TO AFFECTED NAILS UNTIL RESOLVED., Disp: , Rfl:     losartan (COZAAR) 25 MG tablet, Take 2 tablets by mouth Daily., Disp: , Rfl:     metFORMIN ER (GLUCOPHAGE-XR) 500 MG 24 hr tablet, TAKE 1 TABLET BY MOUTH WITH THE EVENING MEAL, Disp: , Rfl:     nebivolol (BYSTOLIC) 10 MG tablet, Take 1 tablet by mouth Daily., Disp: , Rfl:     omeprazole (priLOSEC) 20 MG capsule, Take 1 capsule by mouth Daily., Disp: , Rfl:     Probiotic Product (PROBIOTIC ADVANCED PO), Take  by mouth., Disp: , Rfl:     vitamin D3 125 MCG (5000 UT) capsule capsule, Take 1 capsule by mouth Daily., Disp: , Rfl:      Past Medical History:   Diagnosis Date    Arrhythmia     Arthritis      "Atrial fibrillation     Chest pain     Chicken pox     Clostridium difficile infection     Remote apparent clostridium difficile infection with severe diarrhea and colonoscopy with fecal transplant, 2012.      Colitis     Diabetes mellitus     Fatty liver     GERD (gastroesophageal reflux disease)     Heart murmur     Hiatal hernia     - probable GERD syndrome.      History of echocardiogram     History of PSVT (paroxysmal supraventricular tachycardia)     History of stress test     Hyperlipidemia     Hypertension     Measles     Menopause         Past Surgical History:   Procedure Laterality Date    CARDIAC ABLATION      CATARACT EXTRACTION      DILATATION AND CURETTAGE      History of D&C x2    KNEE MENISCAL REPAIR      LASIK      VITRECTOMY         The additional following portions of the patient's history were reviewed and updated as appropriate: allergies and current medications.    Review of Systems    I have reviewed and agree with the HPI, ROS, and historical information as entered above. Melissa Bustos MD      Objective   /80   Ht 162.6 cm (64\")   Wt 91.6 kg (202 lb)   BMI 34.67 kg/m²     Physical Exam  Physical Exam:  General:  well developed; well nourished  no acute distress  obese - Body mass index is 34.67 kg/m².   Abdomen: soft, non-tender; no masses  no umbilical or inguinal hernias are present   Pelvis: Not performed.      Assessment & Plan     Assessment     Problem List Items Addressed This Visit       Endometrial mass - Primary    Relevant Orders    US Non-ob Transvaginal (Completed)       Plan     Needs hysteroscopy, D&C, myosure.  F/u for Annual and preop appt.  Return for NEEDS SURGERY SCHEDULED.        Melissa Bustos MD  11/16/2023   "

## 2023-11-30 ENCOUNTER — OFFICE VISIT (OUTPATIENT)
Dept: OBSTETRICS AND GYNECOLOGY | Facility: CLINIC | Age: 68
End: 2023-11-30
Payer: MEDICARE

## 2023-11-30 VITALS
SYSTOLIC BLOOD PRESSURE: 122 MMHG | DIASTOLIC BLOOD PRESSURE: 78 MMHG | HEIGHT: 64 IN | WEIGHT: 203.8 LBS | BODY MASS INDEX: 34.79 KG/M2

## 2023-11-30 DIAGNOSIS — N94.89 ENDOMETRIAL MASS: Primary | ICD-10-CM

## 2023-11-30 DIAGNOSIS — Z12.31 ENCOUNTER FOR SCREENING MAMMOGRAM FOR MALIGNANT NEOPLASM OF BREAST: ICD-10-CM

## 2023-11-30 DIAGNOSIS — Z01.419 WOMEN'S ANNUAL ROUTINE GYNECOLOGICAL EXAMINATION: ICD-10-CM

## 2023-11-30 RX ORDER — ASPIRIN 81 MG/1
81 TABLET, CHEWABLE ORAL DAILY
COMMUNITY

## 2023-11-30 NOTE — PROGRESS NOTES
Gynecologic Preoperative Exam Note        Subjective   Malia Geiger is a 68 y.o. year old  who is scheduled for hysteroscopy with Myosure and hysteroscopy/D&C at [facility] on 2023 at 7:30AM.   Her pre operative diagnosis is Postmenopausal Vaginal Bleeding. She does not need to see her PCP for preop clearance for this surgery. No LMP recorded. Patient is postmenopausal.. Her birth control method is no method at present time.  Her BMI is Body mass index is 34.96 kg/m²..        She understands the risks of bleeding, infection, possible damage to other organ systems, including but not limited to the gastrointestinal tract and genitourinary tract.  She also understands the specific risks listed in the preop information (video, pamphlets, etc.).    She has reviewed and signed the preop consent form.    She has been instructed to have a light dinner the night before surgery, then nothing to eat or drink after midnight.  The day of surgery do not chew gum or smoke.  Remove all jewelry, nail polish, contact lenses prior to coming to the hospital.  Do not bring valuables or large sums of money with you. Patient was instructed on what time to arrive and where to check in, maps were given.  She was instructed that she will meet an Anesthesiologist and that an IV will be started to provide fluids and sedation.  The total time of procedure was discussed.  She was instructed that she will need a .      Postmenopausal visit    Chief Complaint   Patient presents with    postmenopausal visit    Pre-op Exam        Subjective     HPI  Malia Geiger is a 68 y.o. female, , who presents as a(n) established patient. She is postmenopausal. Patient reports problems with: none. Pt. reports stress urinary incontinence. There were no changes to her medical or surgical history since her last visit.. Partner Status: Marital Status: .  She is not currently sexually active. STD testing recommendations have been  explained to the patient and she does not desire STD testing.    Additional OB/GYN History     On HRT? No    Last Pap : 2022. Results: negative. HPV: negative    Last Completed Pap Smear       This patient has no relevant Health Maintenance data.          History of abnormal Pap smear: no  Family history of uterine, colon, breast, or ovarian cancer: yes - Maternal Grandmother was just advised it was female cancer   Performs monthly Self-Breast Exam: no  Last mammogram: . Done at [facility] per patient negative .    Last Completed Mammogram       This patient has no relevant Health Maintenance data.          Last colonoscopy: under 10 years ago per patient negative     Last Completed Colonoscopy       This patient has no relevant Health Maintenance data.          Last DEXA:  negative   Exercises Regularly: no  Feelings of Anxiety or Depression: no      Tobacco Usage?: No       Current Outpatient Medications:     ciclopirox (PENLAC) 8 % solution, APPLY TWICE A DAY TO AFFECTED NAILS UNTIL RESOLVED., Disp: , Rfl:     losartan (COZAAR) 25 MG tablet, Take 2 tablets by mouth Daily., Disp: , Rfl:     metFORMIN ER (GLUCOPHAGE-XR) 500 MG 24 hr tablet, TAKE 1 TABLET BY MOUTH WITH THE EVENING MEAL, Disp: , Rfl:     nebivolol (BYSTOLIC) 10 MG tablet, Take 1 tablet by mouth Daily., Disp: , Rfl:     omeprazole (priLOSEC) 20 MG capsule, Take 1 capsule by mouth Daily., Disp: , Rfl:     Probiotic Product (PROBIOTIC ADVANCED PO), Take  by mouth., Disp: , Rfl:     vitamin D3 125 MCG (5000 UT) capsule capsule, Take 1 capsule by mouth Daily., Disp: , Rfl:     Patient denies the need for medication refills today.    OB History          3    Para   3    Term                AB        Living             SAB        IAB        Ectopic        Molar        Multiple        Live Births                    Past Medical History:   Diagnosis Date    Arrhythmia     Arthritis     Atrial fibrillation     Chest pain      Chicken pox     Clostridium difficile infection     Remote apparent clostridium difficile infection with severe diarrhea and colonoscopy with fecal transplant, 2012.      Colitis     Diabetes mellitus     Fatty liver     GERD (gastroesophageal reflux disease)     Heart murmur     Hiatal hernia     - probable GERD syndrome.      History of echocardiogram     History of PSVT (paroxysmal supraventricular tachycardia)     History of stress test     Hyperlipidemia     Hypertension     Measles     Menopause         Past Surgical History:   Procedure Laterality Date    CARDIAC ABLATION      CATARACT EXTRACTION      DILATATION AND CURETTAGE      History of D&C x2    KNEE MENISCAL REPAIR      LASIK      VITRECTOMY         Health Maintenance   Topic Date Due    DXA SCAN  Never done    BMI FOLLOWUP  Never done    COLORECTAL CANCER SCREENING  Never done    ZOSTER VACCINE (1 of 2) Never done    MAMMOGRAM  06/03/2017    Pneumococcal Vaccine 65+ (1 - PCV) Never done    HEPATITIS C SCREENING  Never done    ANNUAL WELLNESS VISIT  Never done    LIPID PANEL  05/18/2022    INFLUENZA VACCINE  Never done    COVID-19 Vaccine (5 - 2023-24 season) 09/01/2023    TDAP/TD VACCINES (2 - Td or Tdap) 07/14/2030       The additional following portions of the patient's history were reviewed and updated as appropriate: allergies, current medications, past family history, past medical history, past social history, past surgical history, and problem list.    Allergies   Allergen Reactions    Atorvastatin Myalgia    Erythromycin GI Intolerance    Lisinopril Cough    Phenergan [Promethazine] Confusion    Voltaren [Diclofenac Sodium] GI Intolerance    Penicillins Rash    Sulfa Antibiotics Rash     She has confirmed that she is not allergic to Latex.     She is on the following medications. These were reviewed with the patient today and instructed on which medications are ok to take with a sip of water prior to the surgery.      Current Outpatient  Medications:     aspirin 81 MG chewable tablet, Chew 1 tablet Daily., Disp: , Rfl:     losartan (COZAAR) 25 MG tablet, Take 4 tablets by mouth Daily., Disp: , Rfl:     metFORMIN ER (GLUCOPHAGE-XR) 500 MG 24 hr tablet, TAKE 1 TABLET BY MOUTH WITH THE EVENING MEAL, Disp: , Rfl:     nebivolol (BYSTOLIC) 10 MG tablet, Take 1 tablet by mouth Daily., Disp: , Rfl:     omeprazole (priLOSEC) 20 MG capsule, Take 1 capsule by mouth Daily., Disp: , Rfl:     Probiotic Product (PROBIOTIC ADVANCED PO), Take  by mouth., Disp: , Rfl:     vitamin D3 125 MCG (5000 UT) capsule capsule, Take 1 capsule by mouth Daily., Disp: , Rfl:      Past Medical History:   Diagnosis Date    Arrhythmia     Arthritis     Atrial fibrillation     Chest pain     Chicken pox     Clostridium difficile infection     Remote apparent clostridium difficile infection with severe diarrhea and colonoscopy with fecal transplant, .      Colitis     Diabetes mellitus     Fatty liver     GERD (gastroesophageal reflux disease)     Heart murmur     Hiatal hernia     - probable GERD syndrome.      History of echocardiogram     History of PSVT (paroxysmal supraventricular tachycardia)     History of stress test     Hyperlipidemia     Hypertension     Measles     Menopause      Past Surgical History:   Procedure Laterality Date    CARDIAC ABLATION      CATARACT EXTRACTION      DILATATION AND CURETTAGE      History of D&C x2    KNEE MENISCAL REPAIR      LASIK      VITRECTOMY       OB History    Para Term  AB Living   3 3 0 0 0 0   SAB IAB Ectopic Molar Multiple Live Births   0 0 0 0 0 0      # Outcome Date GA Lbr Home/2nd Weight Sex Delivery Anes PTL Lv   3 Para            2 Para            1 Para              Social History     Tobacco Use   Smoking Status Never   Smokeless Tobacco Never     Social History     Substance and Sexual Activity   Alcohol Use Never     Social History     Substance and Sexual Activity   Drug Use Never         Review of Systems    All other systems reviewed and negative.          Objective    Vitals:    11/30/23 1005   BP: 122/78         Physical Exam    General:  well developed; well nourished  no acute distress; BMI 35  Skin:  No suspicious lesions seen  Thyroid: normal to inspection and palpation  Breasts:  Examined in supine position  Symmetric without masses or skin dimpling  Nipples normal without inversion, lesions or discharge  There are no palpable axillary nodes  CVS: RRR, no M/R/G, distal pulses wnl  Resp: CTAB, No W/R/R  Abdomen: soft, non-tender; no masses  no umbilical or inguinal hernias are present  no hepato-splenomegaly  Pelvis: Deferred to OR  Ext: 2+ pulses, no edema     Assessment   Problem List Items Addressed This Visit       Women's annual routine gynecological examination    Endometrial mass - Primary    Relevant Orders    CBC & Differential    Mammo Screening Digital Tomosynthesis Bilateral With CAD    Encounter for screening mammogram for malignant neoplasm of breast    Relevant Orders    Mammo Screening Digital Tomosynthesis Bilateral With CAD                Plan   hysteroscopy with D&C, possible Myosure polypectomy  Cytotec 200mcg buccally at admission to [facility].  Risks of surgery were reviewed with the patient including risks of bleeding, infection, damage to other organ systems including, but not limited to GI and  tracts (bowel, bladder, blood vessels, nerves) risks of Anesthesia, as well as the risk the surgery will not produce the desired results, possible need for additional surgery, death, risk of uterine perforation.  PAT Scheduled    Joe has been obtained and reviewed   Pain Medication Consent Form has been signed.  A review regarding proper medication administration, impact on driving and working while medicated, the safety of use in pregnancy, the potential for overdose and the proper disposal and storage of controlled medications has been done with the patient.          Melissa Bustos,  MD  Visit Date: 11/30/2023

## 2023-12-01 ENCOUNTER — OUTSIDE FACILITY SERVICE (OUTPATIENT)
Dept: OBSTETRICS AND GYNECOLOGY | Facility: CLINIC | Age: 68
End: 2023-12-01
Payer: MEDICARE

## 2023-12-01 ENCOUNTER — LAB REQUISITION (OUTPATIENT)
Dept: LAB | Facility: HOSPITAL | Age: 68
End: 2023-12-01
Payer: MEDICARE

## 2023-12-01 DIAGNOSIS — N94.89 OTHER SPECIFIED CONDITIONS ASSOCIATED WITH FEMALE GENITAL ORGANS AND MENSTRUAL CYCLE: ICD-10-CM

## 2023-12-01 PROCEDURE — 88305 TISSUE EXAM BY PATHOLOGIST: CPT | Performed by: OBSTETRICS & GYNECOLOGY

## 2023-12-04 LAB
CYTO UR: NORMAL
LAB AP CASE REPORT: NORMAL
LAB AP CLINICAL INFORMATION: NORMAL
PATH REPORT.FINAL DX SPEC: NORMAL
PATH REPORT.GROSS SPEC: NORMAL

## 2023-12-04 NOTE — PROGRESS NOTES
"No cancer but likely will want to start progesterone for \"proliferative endometrium\".  We'll talk about it at her postop."

## 2023-12-08 ENCOUNTER — TELEPHONE (OUTPATIENT)
Dept: OBSTETRICS AND GYNECOLOGY | Facility: CLINIC | Age: 68
End: 2023-12-08
Payer: MEDICARE

## 2023-12-08 NOTE — TELEPHONE ENCOUNTER
Reviewed results of hysteroscopy dilation and curettage with Myosure with patient; answered patient questions and encouraged patient to bring up questions regarding polyps and percentage of recurrence to MD at her post-op visit. Patient also with questions about CBC order placed on her chart. Informed patient it appeared to be meant for collection pre-op, but that it may be clarified at her post-op visit as well. Verbalized understanding.

## 2023-12-14 ENCOUNTER — OFFICE VISIT (OUTPATIENT)
Dept: OBSTETRICS AND GYNECOLOGY | Facility: CLINIC | Age: 68
End: 2023-12-14
Payer: MEDICARE

## 2023-12-14 VITALS
DIASTOLIC BLOOD PRESSURE: 62 MMHG | WEIGHT: 200.4 LBS | BODY MASS INDEX: 34.21 KG/M2 | HEIGHT: 64 IN | SYSTOLIC BLOOD PRESSURE: 118 MMHG

## 2023-12-14 DIAGNOSIS — R93.89 ENDOMETRIAL THICKENING ON ULTRASOUND: ICD-10-CM

## 2023-12-14 DIAGNOSIS — Z48.89 POSTOPERATIVE VISIT: ICD-10-CM

## 2023-12-14 DIAGNOSIS — Z12.31 ENCOUNTER FOR SCREENING MAMMOGRAM FOR MALIGNANT NEOPLASM OF BREAST: Primary | ICD-10-CM

## 2023-12-14 DIAGNOSIS — Z01.419 ROUTINE GYNECOLOGICAL EXAMINATION: ICD-10-CM

## 2023-12-14 PROCEDURE — 3074F SYST BP LT 130 MM HG: CPT | Performed by: OBSTETRICS & GYNECOLOGY

## 2023-12-14 PROCEDURE — 1159F MED LIST DOCD IN RCRD: CPT | Performed by: OBSTETRICS & GYNECOLOGY

## 2023-12-14 PROCEDURE — 99024 POSTOP FOLLOW-UP VISIT: CPT | Performed by: OBSTETRICS & GYNECOLOGY

## 2023-12-14 PROCEDURE — 3078F DIAST BP <80 MM HG: CPT | Performed by: OBSTETRICS & GYNECOLOGY

## 2023-12-14 PROCEDURE — 1160F RVW MEDS BY RX/DR IN RCRD: CPT | Performed by: OBSTETRICS & GYNECOLOGY

## 2023-12-14 RX ORDER — MEDROXYPROGESTERONE ACETATE 10 MG/1
10 TABLET ORAL DAILY
Qty: 10 TABLET | Refills: 3 | Status: SHIPPED | OUTPATIENT
Start: 2023-12-14 | End: 2023-12-24

## 2023-12-14 NOTE — PROGRESS NOTES
OBGYN Postoperative Exam Note          Subjective   Chief Complaint   Patient presents with    Post-op     Malia Geiger is a 68 y.o. year old  presenting to be seen for her post-operative visit. She is S/P hysteroscopy dilation and curettage with Myosure on 23 at [facility] for Postmenopausal Vaginal Bleeding. Currently she reports no problems with eating, bowel movements, voiding, or wound drainage and pain is well controlled. She reports mild cramping. She reports light bleeding for 3 days following her procedure that has since resolved.    The pathology results from her procedure are in Malia's record and are benign but show proliferative endometrium.      OTHER THINGS SHE WANTS TO DISCUSS TODAY:  Nothing else      Current Outpatient Medications:     aspirin 81 MG chewable tablet, Chew 1 tablet Daily., Disp: , Rfl:     losartan (COZAAR) 25 MG tablet, Take 4 tablets by mouth Daily., Disp: , Rfl:     metFORMIN ER (GLUCOPHAGE-XR) 500 MG 24 hr tablet, TAKE 1 TABLET BY MOUTH WITH THE EVENING MEAL, Disp: , Rfl:     nebivolol (BYSTOLIC) 10 MG tablet, Take 1 tablet by mouth Daily., Disp: , Rfl:     omeprazole (priLOSEC) 20 MG capsule, Take 1 capsule by mouth Daily., Disp: , Rfl:     Probiotic Product (PROBIOTIC ADVANCED PO), Take  by mouth., Disp: , Rfl:     vitamin D3 125 MCG (5000 UT) capsule capsule, Take 1 capsule by mouth Daily., Disp: , Rfl:      Past Medical History:   Diagnosis Date    Arrhythmia     Arthritis     Atrial fibrillation     Chest pain     Chicken pox     Clostridium difficile infection     Remote apparent clostridium difficile infection with severe diarrhea and colonoscopy with fecal transplant, .      Colitis     Diabetes mellitus     Fatty liver     GERD (gastroesophageal reflux disease)     Heart murmur     Hiatal hernia     - probable GERD syndrome.      History of echocardiogram     History of PSVT (paroxysmal supraventricular tachycardia)     History of stress test      "Hyperlipidemia     Hypertension     Measles     Menopause         Past Surgical History:   Procedure Laterality Date    CARDIAC ABLATION      CATARACT EXTRACTION      DILATATION AND CURETTAGE      History of D&C x2    KNEE MENISCAL REPAIR      LASIK      VITRECTOMY         The following portions of the patient's history were reviewed and updated as appropriate:current medications and allergies    Review of Systems   Constitutional: Negative.    HENT: Negative.     Eyes: Negative.    Respiratory: Negative.     Cardiovascular: Negative.    Gastrointestinal: Negative.    Endocrine: Negative.    Genitourinary:         Mild cramping    Musculoskeletal: Negative.    Skin: Negative.    Allergic/Immunologic: Negative.    Neurological: Negative.    Hematological: Negative.    Psychiatric/Behavioral: Negative.            Objective   /62   Ht 162.6 cm (64.02\")   Wt 90.9 kg (200 lb 6.4 oz)   BMI 34.38 kg/m²     Physical Exam         Assessment   S/P hysteroscopy, D&C     Plan   May return to full activity with no restrictions  Reviewed proliferative endometrium not normal post menopause.  Declines use of exogenous hormones  The importance of keeping all planned follow-up and taking all medications as prescribed was emphasized.  Cyclic progesterone first 10 days each mo x 3mo, then f/u.  If no bleeding x at least a mo or two, space out to every 3mo, then 6mo, then stop if no bleeding anymore.  Return in about 4 months (around 4/14/2024).              Melissa Bustos MD  12/14/2023     Answers submitted by the patient for this visit:  Other (Submitted on 12/7/2023)  Please describe your symptoms.: Surgery follow up.  Have you had these symptoms before?: No  How long have you been having these symptoms?: 1-4 days  Primary Reason for Visit (Submitted on 12/7/2023)  What is the primary reason for your visit?: Other    "

## 2024-01-10 ENCOUNTER — TELEPHONE (OUTPATIENT)
Dept: OBSTETRICS AND GYNECOLOGY | Facility: CLINIC | Age: 69
End: 2024-01-10
Payer: MEDICARE

## 2024-01-10 NOTE — TELEPHONE ENCOUNTER
Pt. Wanting to know if she should be taking estrogen with the progesterone because she had read that you should not take 1 without the other. Informed you should take progesterone if you are on estrogen, but you do not have to take estrogen when taking progesterone. She DIANE. Also wanted to confirm how to take it, instructed per BETSY's note to take the first 10 days of each month. Sera CONTRERAS states she did not start it Jan 1, informed that she could start it today but just start next month on 2/10 and so on. Sera CONTRERAS

## 2024-01-24 ENCOUNTER — TELEPHONE (OUTPATIENT)
Dept: OBSTETRICS AND GYNECOLOGY | Facility: CLINIC | Age: 69
End: 2024-01-24
Payer: MEDICARE

## 2024-01-24 NOTE — TELEPHONE ENCOUNTER
Patient called with c/o vaginal bleeding.  She is S/P hysteroscopy dilation and curettage with Myosure on 12/1/23 at [facility] for Postmenopausal Vaginal Bleeding.  Was started on cyclic Prometrium at post op appt 12/14/2023 for proliferative endometrium.    She reports that she began bleeding last night.  She denies heavy bleeding, and has only used one pad thus far today.  She reports mild to moderate period like cramps and bloating today.  She took progesterone from January 10th-20th.     Reviewed with Charlene- it sounds to be normal, as it sounds like to goal is to thin her lining back out.  Call back with severe pain or heavy bleeding. Reviewed with patient.  She verified and voiced understanding.

## 2024-04-18 ENCOUNTER — OFFICE VISIT (OUTPATIENT)
Dept: OBSTETRICS AND GYNECOLOGY | Facility: CLINIC | Age: 69
End: 2024-04-18
Payer: MEDICARE

## 2024-04-18 VITALS
HEIGHT: 64 IN | SYSTOLIC BLOOD PRESSURE: 122 MMHG | WEIGHT: 207.2 LBS | DIASTOLIC BLOOD PRESSURE: 72 MMHG | BODY MASS INDEX: 35.37 KG/M2

## 2024-04-18 DIAGNOSIS — N95.0 PMB (POSTMENOPAUSAL BLEEDING): Primary | ICD-10-CM

## 2024-04-18 DIAGNOSIS — R14.0 ABDOMINAL BLOATING: ICD-10-CM

## 2024-04-18 DIAGNOSIS — N94.89 ENDOMETRIAL MASS: ICD-10-CM

## 2024-04-18 PROBLEM — Z48.89 POSTOPERATIVE VISIT: Status: RESOLVED | Noted: 2023-12-14 | Resolved: 2024-04-18

## 2024-04-18 NOTE — PROGRESS NOTES
Chief Complaint   Patient presents with    Follow-up     S/P hysteroscopy D&C with Myosure on 23- Proliferative endometrium        Subjective   HPI  Malia Geiger is a 69 y.o. female, .  No LMP recorded. Patient is postmenopausal.. who presents for follow up on proliferative endometrium noted on pathology following hysteroscopy D&C with Myosure on 23.      At her last visit she was treated with cyclic progesterone for the first 10 days each month x 3 months. She reported having 3 full periods that lasted for 7 days. She also reports 3 days of additional light bleeding last week after she had already stopped bleeding.The patient reports additional symptoms as pain above her belly button. She also reports having trouble with bowels. She states the progesterone made her feel irritable and mean.        Additional OB/GYN History     Last Pap : 22- neg, HPV neg   Last Completed Pap Smear       This patient has no relevant Health Maintenance data.            Last mammogram: 6/3/15  Last Completed Mammogram       This patient has no relevant Health Maintenance data.            Tobacco Usage?: No   OB History          3    Para   3    Term                AB        Living             SAB        IAB        Ectopic        Molar        Multiple        Live Births                      Current Outpatient Medications:     aspirin 81 MG chewable tablet, Chew 1 tablet Daily., Disp: , Rfl:     losartan (COZAAR) 25 MG tablet, Take 4 tablets by mouth Daily., Disp: , Rfl:     metFORMIN ER (GLUCOPHAGE-XR) 500 MG 24 hr tablet, TAKE 1 TABLET BY MOUTH WITH THE EVENING MEAL, Disp: , Rfl:     nebivolol (BYSTOLIC) 10 MG tablet, Take 1 tablet by mouth Daily., Disp: , Rfl:     omeprazole (priLOSEC) 20 MG capsule, Take 1 capsule by mouth Daily., Disp: , Rfl:     Probiotic Product (PROBIOTIC ADVANCED PO), Take  by mouth., Disp: , Rfl:     vitamin D3 125 MCG (5000 UT) capsule capsule, Take 1 capsule by  "mouth Daily., Disp: , Rfl:      Past Medical History:   Diagnosis Date    Arrhythmia     Arthritis     Atrial fibrillation     Chest pain     Chicken pox     Clostridium difficile infection     Remote apparent clostridium difficile infection with severe diarrhea and colonoscopy with fecal transplant, 2012.      Colitis     Diabetes mellitus     Fatty liver     GERD (gastroesophageal reflux disease)     Heart murmur     Hiatal hernia     - probable GERD syndrome.      History of echocardiogram     History of PSVT (paroxysmal supraventricular tachycardia)     History of stress test     Hyperlipidemia     Hypertension     Measles     Menopause         Past Surgical History:   Procedure Laterality Date    CARDIAC ABLATION      CATARACT EXTRACTION      DILATATION AND CURETTAGE      History of D&C x2    KNEE MENISCAL REPAIR      LASIK      VITRECTOMY         The additional following portions of the patient's history were reviewed and updated as appropriate: allergies and current medications.    Review of Systems    I have reviewed and agree with the HPI, ROS, and historical information as entered above. Melissa Bustos MD      Objective   /72   Ht 162.6 cm (64.02\")   Wt 94 kg (207 lb 3.2 oz)   BMI 35.54 kg/m²     Physical Exam  Physical Exam:  General:  well developed; well nourished  no acute distress  BMI 36   Abdomen: soft, non-tender; no masses  no umbilical or inguinal hernias are present   Pelvis: Not performed.        Assessment & Plan     Assessment     Problem List Items Addressed This Visit       Endometrial mass    PMB (postmenopausal bleeding) - Primary     Other Visit Diagnoses       Abdominal bloating        Relevant Orders    Ambulatory Referral to Gastroenterology              Plan     Reviewed options with continued bleeding.  Will stop progesterone and see if any more bleeding occurs.  Consider bleeding b/c denuded from progesterone effect vs. R/t proliferative endometrium.  We reviewed if " cannot stop bleeding, should consider hysterectomy.  Reviewed risks/benefits of TLH/BSO.  Return in about 4 months (around 8/18/2024) for WITH SONO.        Melissa Bustos MD  04/18/2024

## 2024-07-19 ENCOUNTER — OFFICE VISIT (OUTPATIENT)
Dept: GASTROENTEROLOGY | Facility: CLINIC | Age: 69
End: 2024-07-19
Payer: MEDICARE

## 2024-07-19 DIAGNOSIS — R14.0 BLOATING: ICD-10-CM

## 2024-07-19 DIAGNOSIS — K46.9 ABDOMINAL HERNIA WITHOUT OBSTRUCTION AND WITHOUT GANGRENE, RECURRENCE NOT SPECIFIED, UNSPECIFIED HERNIA TYPE: ICD-10-CM

## 2024-07-19 DIAGNOSIS — K80.21 CALCULUS OF GALLBLADDER WITH BILIARY OBSTRUCTION BUT WITHOUT CHOLECYSTITIS: ICD-10-CM

## 2024-07-19 DIAGNOSIS — Z98.890 HISTORY OF COLONOSCOPY: ICD-10-CM

## 2024-07-19 DIAGNOSIS — K76.0 FATTY INFILTRATION OF LIVER: ICD-10-CM

## 2024-07-19 DIAGNOSIS — K21.9 GASTROESOPHAGEAL REFLUX DISEASE, UNSPECIFIED WHETHER ESOPHAGITIS PRESENT: Primary | ICD-10-CM

## 2024-07-19 DIAGNOSIS — R19.5 LOOSE STOOLS: ICD-10-CM

## 2024-07-19 PROCEDURE — 3078F DIAST BP <80 MM HG: CPT | Performed by: NURSE PRACTITIONER

## 2024-07-19 PROCEDURE — 1160F RVW MEDS BY RX/DR IN RCRD: CPT | Performed by: NURSE PRACTITIONER

## 2024-07-19 PROCEDURE — 1159F MED LIST DOCD IN RCRD: CPT | Performed by: NURSE PRACTITIONER

## 2024-07-19 PROCEDURE — 3074F SYST BP LT 130 MM HG: CPT | Performed by: NURSE PRACTITIONER

## 2024-07-19 PROCEDURE — 99214 OFFICE O/P EST MOD 30 MIN: CPT | Performed by: NURSE PRACTITIONER

## 2024-07-19 NOTE — Clinical Note
Laura, we please call [facility] and get most recent CT scan result from April or May 2024 as well as most recent colonoscopy report from [facility] or Dr. Brink.  Thanks!    pool: Please schedule patient an approximate 3-month follow-up visit Thanks!

## 2024-07-25 VITALS
SYSTOLIC BLOOD PRESSURE: 128 MMHG | WEIGHT: 210.4 LBS | BODY MASS INDEX: 35.92 KG/M2 | HEART RATE: 53 BPM | HEIGHT: 64 IN | DIASTOLIC BLOOD PRESSURE: 74 MMHG

## 2024-07-25 NOTE — PROGRESS NOTES
"GASTROENTEROLOGY OFFICE NOTE    Malia Geiger  [id number]  1955    CARE TEAM  Patient Care Team:  iSlvia Corbin MD as PCP - General (Family Medicine)  Kiki Herrera APRN as Nurse Practitioner (Cardiology)    Referring Provider: Melissa Bustos*    Chief Complaint   Patient presents with    Bloating, trouble with bowels        HISTORY OF PRESENT ILLNESS:   Malia Geiger is a 69 y.o. female who presents to the clinic today as a referral from Dr. Bustos due to bloating and \"trouble with bowels\".    Patient also reports she is taking proton pump inhibitor and would like to decrease dose.  She reports history of reflux, chest symptoms. She had prior EGD per Dr. 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  She had prior colonoscopy per Dr. Brink.      She has history of distended/hard abdomen, epigastric pain, left lower quadrant pain, constipation, loose stool up to 4 times daily.  She reports bowel movement yesterday and last night.     She reports lower abdominal pressure with incomplete evacuation.  She reports history of taking Konsyl, MiraLAX per Dr. Hernandez recommendations for a few years but has discontinued use.    No identifiable aggravating factors for lower abdominal pressure with sensation of incomplete evacuation.  Lower abdominal pressure seems improved when she is not eating or it has been a a while since she has eaten.    She has history of taking Ozempic and experienced constipation.  It has been discussed/considered to start Mounjaro due to less GI symptoms and she would like to know if she should take Mounjaro.    She also reports recent CT scan at [facility] that revealed cholelithiasis, fatty liver, diverticulosis and abdominal hernia.      Past Medical History:   Diagnosis Date    Arrhythmia     Arthritis     Atrial fibrillation     Chest pain     Chicken pox     Clostridium difficile infection  "    Remote apparent clostridium difficile infection with severe diarrhea and colonoscopy with fecal transplant, 2012.      Colitis     Diabetes mellitus     Fatty liver     GERD (gastroesophageal reflux disease)     Heart murmur     Hiatal hernia     - probable GERD syndrome.      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 Brink    DILATATION AND CURETTAGE      History of D&C x2    KNEE MENISCAL REPAIR      LASIK      UPPER GASTROINTESTINAL ENDOSCOPY      Dr. Hernandez    VITRECTOMY          Current Outpatient Medications on File Prior to Visit   Medication Sig    losartan (COZAAR) 25 MG tablet Take 1 tablet by mouth Daily.    metFORMIN ER (GLUCOPHAGE-XR) 500 MG 24 hr tablet TAKE 1 TABLET BY MOUTH WITH THE EVENING MEAL    nebivolol (BYSTOLIC) 5 MG tablet Take 1 tablet by mouth Daily.    omeprazole (priLOSEC) 20 MG capsule Take 1 capsule by mouth Daily.    Probiotic Product (PROBIOTIC ADVANCED PO) Take  by mouth.    vitamin D3 125 MCG (5000 UT) capsule capsule Take 1 capsule by mouth Daily.    vitamin B-12 (CYANOCOBALAMIN) 100 MCG tablet Take 0.5 tablets by mouth Daily.     No current facility-administered medications on file prior to visit.       Allergies   Allergen Reactions    Atorvastatin Myalgia    Erythromycin GI Intolerance    Lisinopril Cough    Phenergan [Promethazine] Confusion    Voltaren [Diclofenac Sodium] GI Intolerance    Penicillins Rash    Sulfa Antibiotics Rash       Family History   Problem Relation Age of Onset    Hyperlipidemia Mother     Hypertension Mother     Heart attack Mother     Diabetes Mother     Lymphoma Father     Cancer Maternal Grandmother     Leukemia Maternal  "Grandfather     No Known Problems Paternal Grandmother     No Known Problems Paternal Grandfather        Social History     Socioeconomic History    Marital status:    Tobacco Use    Smoking status: Never    Smokeless tobacco: Never   Vaping Use    Vaping status: Never Used   Substance and Sexual Activity    Alcohol use: Never    Drug use: Never    Sexual activity: Not Currently     Partners: Male       PHYSICAL EXAM   /74 (BP Location: Right arm, Patient Position: Sitting, Cuff Size: Large Adult)   Pulse 53   Ht 162.6 cm (64\")   Wt 95.4 kg (210 lb 6.4 oz)   BMI 36.12 kg/m²   Physical Exam  Constitutional:       General: She is not in acute distress.     Appearance: She is not toxic-appearing.   HENT:      Head: Normocephalic and atraumatic. No contusion.      Right Ear: External ear normal.      Left Ear: External ear normal.   Eyes:      General: Lids are normal. No scleral icterus.        Right eye: No discharge.         Left eye: No discharge.      Extraocular Movements: Extraocular movements intact.   Neck:      Trachea: Trachea normal.      Comments: No visible mass  No visible adenopathy  Cardiovascular:      Rate and Rhythm: Bradycardia present.   Pulmonary:      Effort: No respiratory distress.      Comments: Symmetrical expansion    Abdominal:      Palpations: Abdomen is soft. There is no mass.      Tenderness: There is no abdominal tenderness.   Musculoskeletal:      Comments: Symmetrical movement of upper extremities  Symmetrical movement of lower extremities  No visible deformities   Skin:     General: Skin is warm and dry.      Coloration: Skin is not jaundiced.   Neurological:      General: No focal deficit present.      Mental Status: She is alert and oriented to person, place, and time.   Psychiatric:         Mood and Affect: Mood normal.         Behavior: Behavior normal.         Thought Content: Thought content normal.         ASSESSMENT / PLAN  1. Gastroesophageal reflux disease, " unspecified whether esophagitis present  -She may consider decreased use of omeprazole from daily to every other day or so, continue lifestyle modification for reflux.  -I would like for her to have improvement in GI symptoms before starting Mounjaro but recommend she discuss with provider that plans to prescribe Mounjaro.  2. Bloating  3. Loose stools  -I suspect she may have had improvement in bowel movements in the past with use of Konsyl and MiraLAX as previously recommended by Dr. Hernandez she reported taking Konsyl and MiraLAX for a few years in the past.  -Recommend she start over-the-counter fiber supplement such as 2 FiberCon tablets or Metamucil supplement daily with 1 dose of MiraLAX daily.  She may decrease dose of MiraLAX if she has too loose or too frequent bowel movements.  -Continue to use squatty potty  -Consider pelvic floor physical therapy but when we discussed consideration for pelvic floor physical therapy she declined  4. Calculus of gallbladder with biliary obstruction but without cholecystitis  -She reports recent imaging revealed cholelithiasis.  If in the future it seems as though she is having symptomatic cholelithiasis will recommend evaluation by general surgery.  Recommend she avoid greasy, fatty, fried foods  5. Fatty infiltration of liver  -Try to review liver enzymes prior to next office visit  -Weight loss would be helpful for fatty liver  6.  hernia   -Will try to obtain most recent CT report for review as I am uncertain as to where she was found to have hernia  7.  History of colonoscopy  - prior colonoscopy per Dr. Brink      Documentation completed after visit due to downtime procedures in place at time of visit.  Return in about 3 months (around 10/19/2024).    Crystal Chairez, APRN  07/19/2024    Addendum JOSE ELIAS Chairez APRN 7/29/2024: 6/29/2023 esophagram was normal.  11/1/2023 comprehensive metabolic panel revealed normal bilirubin 0.4, normal alkaline phosphatase 117, elevated  AST 46, elevated ALT 74.

## 2024-07-29 PROBLEM — M79.672 LEFT FOOT PAIN: Status: ACTIVE | Noted: 2019-05-21

## 2024-07-29 PROBLEM — I10 ESSENTIAL HYPERTENSION: Status: ACTIVE | Noted: 2024-04-24

## 2024-07-29 PROBLEM — K80.20 GALLSTONE: Status: ACTIVE | Noted: 2023-11-14

## 2024-07-29 PROBLEM — E78.5 HYPERLIPIDEMIA: Status: ACTIVE | Noted: 2024-04-24

## 2024-07-29 PROBLEM — E11.9 TYPE 2 DIABETES MELLITUS WITHOUT COMPLICATION: Status: ACTIVE | Noted: 2024-04-24

## 2024-07-29 PROBLEM — M79.641 PAIN OF RIGHT HAND: Status: ACTIVE | Noted: 2022-06-20

## 2024-07-29 PROBLEM — E55.9 VITAMIN D DEFICIENCY: Status: ACTIVE | Noted: 2024-04-24

## 2024-07-29 PROBLEM — M50.30 DDD (DEGENERATIVE DISC DISEASE), CERVICAL: Status: ACTIVE | Noted: 2023-11-14

## 2024-07-29 RX ORDER — LOSARTAN POTASSIUM 25 MG/1
25 TABLET ORAL DAILY
COMMUNITY

## 2024-07-29 RX ORDER — UBIDECARENONE 75 MG
50 CAPSULE ORAL DAILY
COMMUNITY

## 2024-07-29 RX ORDER — NEBIVOLOL 5 MG/1
5 TABLET ORAL DAILY
COMMUNITY

## 2024-08-23 ENCOUNTER — OFFICE VISIT (OUTPATIENT)
Dept: OBSTETRICS AND GYNECOLOGY | Facility: CLINIC | Age: 69
End: 2024-08-23
Payer: MEDICARE

## 2024-08-23 VITALS
SYSTOLIC BLOOD PRESSURE: 102 MMHG | BODY MASS INDEX: 35.68 KG/M2 | HEIGHT: 64 IN | DIASTOLIC BLOOD PRESSURE: 80 MMHG | WEIGHT: 209 LBS

## 2024-08-23 DIAGNOSIS — N94.89 ENDOMETRIAL MASS: Primary | ICD-10-CM

## 2024-08-23 DIAGNOSIS — N95.0 PMB (POSTMENOPAUSAL BLEEDING): ICD-10-CM

## 2024-08-23 NOTE — PROGRESS NOTES
Chief Complaint   Patient presents with    PMB       Subjective   HPI  Malia Geiger is a 69 y.o. female, . Her last LMP was No LMP recorded. Patient is postmenopausal.. who presents for follow up on Post menopausal bleeding.  Has had no bleeding since last visit.    Does note low back pain, tai in AMs.    At her last visit she was treated with  none . Since then she reports her symptoms/issue has improved. The patient reports additional symptoms as none.        Additional OB/GYN History     Last Pap :   Last Completed Pap Smear       This patient has no relevant Health Maintenance data.            Last mammogram:   Last Completed Mammogram       This patient has no relevant Health Maintenance data.            Tobacco Usage?: No   OB History          3    Para   3    Term                AB        Living             SAB        IAB        Ectopic        Molar        Multiple        Live Births                      Current Outpatient Medications:     losartan (COZAAR) 25 MG tablet, Take 1 tablet by mouth Daily., Disp: , Rfl:     metFORMIN ER (GLUCOPHAGE-XR) 500 MG 24 hr tablet, TAKE 1 TABLET BY MOUTH WITH THE EVENING MEAL, Disp: , Rfl:     nebivolol (BYSTOLIC) 5 MG tablet, Take 1 tablet by mouth Daily., Disp: , Rfl:     omeprazole (priLOSEC) 20 MG capsule, Take 1 capsule by mouth Daily., Disp: , Rfl:     Probiotic Product (PROBIOTIC ADVANCED PO), Take  by mouth., Disp: , Rfl:     vitamin B-12 (CYANOCOBALAMIN) 100 MCG tablet, Take 0.5 tablets by mouth Daily., Disp: , Rfl:     vitamin D3 125 MCG (5000 UT) capsule capsule, Take 1 capsule by mouth Daily., Disp: , Rfl:      Past Medical History:   Diagnosis Date    Arrhythmia     Arthritis     Atrial fibrillation     Chest pain     Chicken pox     Clostridium difficile infection     Remote apparent clostridium difficile infection with severe diarrhea and colonoscopy with fecal transplant, .      Colitis     Diabetes mellitus     Fatty  "liver     GERD (gastroesophageal reflux disease)     Heart murmur     Hiatal hernia     - probable GERD syndrome.      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 Brink    DILATATION AND CURETTAGE      History of D&C x2    KNEE MENISCAL REPAIR      LASIK      UPPER GASTROINTESTINAL ENDOSCOPY      Dr. Hernandez    VITRECTOMY         The additional following portions of the patient's history were reviewed and updated as appropriate: allergies and current medications.    Review of Systems    I have reviewed and agree with the HPI, ROS, and historical information as entered above. Melissa Bustos MD      Objective   /80   Ht 162.6 cm (64\")   Wt 94.8 kg (209 lb)   BMI 35.87 kg/m²     Physical Exam  Physical Exam:  General:  well developed; well nourished  no acute distress  obese - Body mass index is 35.87 kg/m².   Abdomen: soft, non-tender; no masses  no umbilical or inguinal hernias are present   Pelvis: Not performed.      Assessment & Plan     Assessment     Problem List Items Addressed This Visit       Endometrial mass - Primary    Relevant Orders    US Non-ob Transvaginal (Completed)    PMB (postmenopausal bleeding)    Overview     Proliferative endometrium on pathology 12/23.            Plan     All questions answered.  Reassurance given.  Endometrium thinner if anything and with no bleeding, reassuring.  Return in about 6 months (around 2/23/2025) for WITH SONO, Annual physical.        Melissa Bustos MD  08/23/2024   "

## 2024-08-23 NOTE — LETTER
August 23, 2024       Silvia Corbin MD    Patient: Malia Geiger   YOB: 1955   Date of Visit: 8/23/2024       Dear Dr. Corbin:    Thank you for referring Malia Geiger to me for evaluation. Below are the relevant portions of my assessment and plan of care.    Assessment & Plan  Assessment      Problem List Items Addressed This Visit         Endometrial mass - Primary     Relevant Orders     US Non-ob Transvaginal (Completed)     PMB (postmenopausal bleeding)     Overview       Proliferative endometrium on pathology 12/23.               Plan      All questions answered.  Reassurance given.  Endometrium thinner if anything and with no bleeding, reassuring.  Return in about 6 months (around 2/23/2025) for WITH SONO, Annual physical.  I also encouraged Bone density.  She thinks it has been a long time since she's had one.      If you have questions, please do not hesitate to call me. I look forward to following Malia along with you.      Sincerely,        Melissa Bustos MD  [facility], OB/GYN

## 2024-12-18 ENCOUNTER — TELEPHONE (OUTPATIENT)
Dept: CARDIOLOGY | Facility: CLINIC | Age: 69
End: 2024-12-18
Payer: MEDICARE

## 2024-12-18 NOTE — TELEPHONE ENCOUNTER
REQ'D LAST SET OF LABS AND OV NOTE FROM NABIL AT  DR. NYASIA RIVERA'S OFFICE FROM [facility] ON 12.18.24 @ 1:21PM.

## 2024-12-26 ENCOUNTER — OFFICE VISIT (OUTPATIENT)
Age: 69
End: 2024-12-26
Payer: MEDICARE

## 2024-12-26 VITALS
WEIGHT: 196 LBS | HEIGHT: 64 IN | DIASTOLIC BLOOD PRESSURE: 88 MMHG | OXYGEN SATURATION: 95 % | SYSTOLIC BLOOD PRESSURE: 138 MMHG | HEART RATE: 59 BPM | BODY MASS INDEX: 33.46 KG/M2

## 2024-12-26 DIAGNOSIS — T50.905A BRADYCARDIA, DRUG INDUCED: ICD-10-CM

## 2024-12-26 DIAGNOSIS — R00.1 BRADYCARDIA, SINUS: ICD-10-CM

## 2024-12-26 DIAGNOSIS — I10 HYPERTENSION, ESSENTIAL: ICD-10-CM

## 2024-12-26 DIAGNOSIS — I47.10 PSVT (PAROXYSMAL SUPRAVENTRICULAR TACHYCARDIA): ICD-10-CM

## 2024-12-26 DIAGNOSIS — R00.1 BRADYCARDIA, DRUG INDUCED: ICD-10-CM

## 2024-12-26 DIAGNOSIS — R06.02 SHORTNESS OF BREATH: Primary | ICD-10-CM

## 2024-12-26 RX ORDER — VALSARTAN 320 MG/1
320 TABLET ORAL DAILY
COMMUNITY
Start: 2024-12-04

## 2024-12-26 RX ORDER — EZETIMIBE 10 MG/1
10 TABLET ORAL DAILY
COMMUNITY
Start: 2024-12-10

## 2024-12-26 NOTE — PROGRESS NOTES
[facility] Provider Note     Date:   2024   Name: Malia Geiger  :   1955  PCP: Silvia Corbin MD    Chief Complaint   Patient presents with    [facility]     Hypertension, Has had ablation for PAT  Neck size 14 in       Subjective     History of Present Illness  Malia Geiger is a 69 y.o. female who presents today for from Silvia Corbin for hypertension. Pt has also had cardiac problems in the past and cardiologist has retired.  B/P has been running high on systolic. 148 low, usually 160-170. Then diastolic was climbing in the upper 80'S. Has went to ER at  a while ago for it and was started on another B/P med. This has been over the last several months.     States that feels grumpy , tired and mean.  Has been having dental pain and can not get into appointment for another month.  Has shortness of breath. Reports that can not do an incline. Did have a stress ECHO done at  about 3 years.  Reports no dizziness,lightheadedness. No syncope.  Reports no swelling just abdominal swelling from time to time and some arthritic swelling.  Reports no chest pain. Occasional flutter.      1. PAT ablation by Dr. Tse    Allergies   Allergen Reactions    Atorvastatin Myalgia    Erythromycin GI Intolerance    Lisinopril Cough    Phenergan [Promethazine] Confusion    Voltaren [Diclofenac Sodium] GI Intolerance    Penicillins Rash    Sulfa Antibiotics Rash       Current Outpatient Medications:     ezetimibe (ZETIA) 10 MG tablet, Take 1 tablet by mouth Daily., Disp: , Rfl:     metFORMIN ER (GLUCOPHAGE-XR) 500 MG 24 hr tablet, TAKE 1 TABLET BY MOUTH WITH THE EVENING MEAL, Disp: , Rfl:     nebivolol (BYSTOLIC) 5 MG tablet, Take 1 tablet by mouth Daily., Disp: , Rfl:     omeprazole (priLOSEC) 20 MG capsule, Take 1 capsule by mouth Daily., Disp: , Rfl:     Probiotic Product (PROBIOTIC ADVANCED PO), Take  by mouth., Disp: , Rfl:     valsartan (DIOVAN) 320 MG tablet, Take 1 tablet  by mouth Daily., Disp: , Rfl:     vitamin B-12 (CYANOCOBALAMIN) 100 MCG tablet, Take 0.5 tablets by mouth Daily., Disp: , Rfl:     vitamin D3 125 MCG (5000 UT) capsule capsule, Take 1 capsule by mouth Daily., Disp: , Rfl:     Past Medical History:   Diagnosis Date    Arrhythmia     Arthritis     Atrial fibrillation     Chest pain     Chicken pox     Clostridium difficile infection     Remote apparent clostridium difficile infection with severe diarrhea and colonoscopy with fecal transplant, 2012.      Colitis     Diabetes mellitus     Fatty liver     GERD (gastroesophageal reflux disease)     Heart murmur     Hiatal hernia     - probable GERD syndrome.      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 Brink    DILATATION AND CURETTAGE      History of D&C x2    KNEE MENISCAL REPAIR      LASIK      UPPER GASTROINTESTINAL ENDOSCOPY      Dr. Hernandez    VITRECTOMY       Family History   Problem Relation Age of Onset    Hyperlipidemia Mother     Hypertension Mother     Heart attack Mother     Diabetes Mother     Lymphoma Father     Cancer Maternal Grandmother     Leukemia Maternal Grandfather     No Known Problems Paternal Grandmother     No Known Problems Paternal Grandfather      Social History     Socioeconomic History    Marital status:    Tobacco Use    Smoking status: Never    Smokeless tobacco: Never   Vaping Use    Vaping status: Never Used   Substance and Sexual Activity    Alcohol use: Never    Drug use: Never    Sexual activity: Not Currently     Partners: Male       Objective     Vital Signs:  /88 (BP Location: Left arm, Patient Position: Sitting, Cuff Size: Adult)   Pulse 59   " Ht 162.6 cm (64\")   Wt 88.9 kg (196 lb)   SpO2 95%   BMI 33.64 kg/m²   Estimated body mass index is 33.64 kg/m² as calculated from the following:    Height as of this encounter: 162.6 cm (64\").    Weight as of this encounter: 88.9 kg (196 lb).         Physical Exam            ECG 12 Lead    Date/Time: 12/26/2024 5:24 PM  Performed by: Maira Brownlee MD    Authorized by: Maira Brownlee MD  Comparison: not compared with previous ECG   Previous ECG: no previous ECG available  Rhythm: sinus bradycardia  Rate: normal  Conduction: conduction normal  T inversion: V1-V6  QRS axis: normal    Clinical impression: abnormal EKG           Assessment and Plan     Assessment & Plan  Shortness of breath  Agree that a very thorough cardiac workup has been done by her primary care provider including coronary calcium/coronary CTA.  No evidence that this is cardiac related.  My guess is that this may be muscular deconditioning.       Hypertension, essential  Plan to monitor and will likely have to change drug management.  May need to increase Bystolic however she is already a little bit bradycardic.  I would like to see how much bradycardia she is having increase in the medication or deciding what to do with it.       PSVT (paroxysmal supraventricular tachycardia)  Status post ablation.  None since that time.  Orders:    Holter Monitor - 72 Hour Up To 15 Days; Future    Bradycardia, drug induced  Before increasing beta-blocker I want to see how low her heart rate is getting on monitor.  Orders:    Holter Monitor - 72 Hour Up To 15 Days; Future    Bradycardia, sinus    Orders:    ECG 12 Lead; Future                 Follow Up  Return in about 4 weeks (around 1/23/2025) for Recheck blood pressure.    CAROL Brownlee MD  Cardiology and Sleep [facility]  12/26/2024     Please note that this explicitly excludes time spent on other separate billable services such as performing procedures or test interpretation, when " applicable.    This note was created using dictation software which occasionally transcribes nonsensical phrases. Please contact the provider if any clarification is needed.

## 2024-12-26 NOTE — ASSESSMENT & PLAN NOTE
Status post ablation.  None since that time.  Orders:    Holter Monitor - 72 Hour Up To 15 Days; Future

## 2024-12-30 ENCOUNTER — PATIENT ROUNDING (BHMG ONLY) (OUTPATIENT)
Dept: CARDIOLOGY | Facility: CLINIC | Age: 69
End: 2024-12-30
Payer: MEDICARE

## 2024-12-30 NOTE — PROGRESS NOTES
..My name is Marcy Mcduffie and I am the Practice Manager for [facility] [place].    I would like to thank you for being a loyal patient. If you do not mind I would like to ask you a few questions about your recent visit with us.  Please feel free to reply if you wish to provide us with feedback on your first visit with our practice.    First, could you tell me what went well with your recent visit?    Secondly, we are always looking for ways to make our patients' experiences even better.  Do you have any recommendations on ways we may improve?    Finally, overall were you satisfied with your first visit to us as a [facility] facility?    In the next few days, you will be receiving a Patient Experience Survey.      Thank you for taking the time to answer a few questions today.  I hope you have a good day.

## 2025-01-30 ENCOUNTER — OFFICE VISIT (OUTPATIENT)
Age: 70
End: 2025-01-30
Payer: MEDICARE

## 2025-01-30 VITALS
HEIGHT: 64 IN | OXYGEN SATURATION: 97 % | DIASTOLIC BLOOD PRESSURE: 80 MMHG | HEART RATE: 62 BPM | WEIGHT: 197 LBS | SYSTOLIC BLOOD PRESSURE: 120 MMHG | BODY MASS INDEX: 33.63 KG/M2

## 2025-01-30 DIAGNOSIS — I47.10 PSVT (PAROXYSMAL SUPRAVENTRICULAR TACHYCARDIA): ICD-10-CM

## 2025-01-30 DIAGNOSIS — R73.09 ELEVATED GLUCOSE: ICD-10-CM

## 2025-01-30 DIAGNOSIS — E78.00 PURE HYPERCHOLESTEROLEMIA: Primary | ICD-10-CM

## 2025-01-30 DIAGNOSIS — I10 HYPERTENSION, ESSENTIAL: ICD-10-CM

## 2025-01-30 PROCEDURE — 99214 OFFICE O/P EST MOD 30 MIN: CPT | Performed by: INTERNAL MEDICINE

## 2025-01-30 PROCEDURE — 3079F DIAST BP 80-89 MM HG: CPT | Performed by: INTERNAL MEDICINE

## 2025-01-30 PROCEDURE — 3074F SYST BP LT 130 MM HG: CPT | Performed by: INTERNAL MEDICINE

## 2025-01-30 RX ORDER — UBIDECARENONE 100 MG
100 CAPSULE ORAL DAILY
COMMUNITY

## 2025-01-30 RX ORDER — VONOPRAZAN FUMARATE 13.36 MG/1
1 TABLET ORAL DAILY
COMMUNITY

## 2025-01-30 RX ORDER — COLESTIPOL HYDROCHLORIDE 1 G/1
TABLET ORAL
COMMUNITY

## 2025-01-30 NOTE — ASSESSMENT & PLAN NOTE
Would like her would like her to have cholesterol rechecked prior to next appointment to ensure that her new medications are going well.    Orders:    Hemoglobin A1c; Future    Lipid Panel; Future    Comprehensive Metabolic Panel; Future    CBC & Differential; Future

## 2025-01-30 NOTE — PROGRESS NOTES
Cardiovascular and Sleep Consulting Provider Note     Date:   2025   Name: Malia Geiger  :   1955  PCP: Silvia Corbin MD    Chief Complaint   Patient presents with    Hypertension     Pt states she is here today for follow up on HTN.     Shortness of Breath     Pt states she is here today for follow up of SOA. She states it's only during activity.        Subjective     History of Present Illness  Malia Geiger is a 69 y.o. female who presents today for Follow up with hypertension and shortness of air.  Reported one episode of chest pain even up into neck with shortness of air. This happened with some abd. Swelling. She associated this with GERD.  Has had some tingling in the left shoulder and over towards heart worse if leans over. Not very frequent and short lived.  Has had some weird fluttering. Does not last very long.  Reports that B/P usually in the 140's.  Reports that is always fatigued and short of breath but not any worse.reports that inclines always make short of breath so it takes her a while to get up it.  Reports no dizziness.     1. PAT ablation by Dr. Tse    Allergies   Allergen Reactions    Atorvastatin Myalgia    Erythromycin GI Intolerance    Lisinopril Cough    Phenergan [Promethazine] Confusion    Voltaren [Diclofenac Sodium] GI Intolerance    Penicillins Rash    Sulfa Antibiotics Rash       Current Outpatient Medications:     coenzyme Q10 100 MG capsule, Take 1 capsule by mouth Daily., Disp: , Rfl:     colestipol (COLESTID) 1 g tablet, , Disp: , Rfl:     ezetimibe (ZETIA) 10 MG tablet, Take 1 tablet by mouth Daily., Disp: , Rfl:     metFORMIN ER (GLUCOPHAGE-XR) 500 MG 24 hr tablet, TAKE 1 TABLET BY MOUTH WITH THE EVENING MEAL, Disp: , Rfl:     nebivolol (BYSTOLIC) 5 MG tablet, Take 1 tablet by mouth Daily., Disp: , Rfl:     Probiotic Product (PROBIOTIC ADVANCED PO), Take  by mouth., Disp: , Rfl:     valsartan (DIOVAN) 320 MG tablet, Take 1 tablet by mouth Daily.,  Disp: , Rfl:     vitamin B-12 (CYANOCOBALAMIN) 100 MCG tablet, Take 0.5 tablets by mouth Daily. (Patient taking differently: Take 1 tablet by mouth Daily.), Disp: , Rfl:     vitamin D3 125 MCG (5000 UT) capsule capsule, Take 1 capsule by mouth Daily., Disp: , Rfl:     Vonoprazan Fumarate (Voquezna) 10 MG tablet, Take 1 tablet by mouth Daily. Given samples, Disp: , Rfl:     Past Medical History:   Diagnosis Date    Arrhythmia     Arthritis     Atrial fibrillation     Chest pain     Chicken pox     Clostridium difficile infection     Remote apparent clostridium difficile infection with severe diarrhea and colonoscopy with fecal transplant, 2012.      Colitis     Diabetes mellitus     Fatty liver     GERD (gastroesophageal reflux disease)     Heart murmur     Hiatal hernia     - probable GERD syndrome.      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 Brink    DILATATION AND CURETTAGE      History of D&C x2    KNEE MENISCAL REPAIR      LASIK      UPPER GASTROINTESTINAL ENDOSCOPY      Dr. Hernandez    VITRECTOMY       Family History   Problem Relation Age of Onset    Hyperlipidemia Mother     Hypertension Mother     Heart attack Mother     Diabetes Mother     Lymphoma Father     Cancer Maternal Grandmother     Leukemia Maternal Grandfather     No Known Problems Paternal Grandmother     No Known Problems Paternal Grandfather      Social History     Socioeconomic History    Marital status:    Tobacco Use    Smoking status: Never     Passive exposure: Never    Smokeless tobacco: Never   Vaping Use    Vaping status: Never Used   Substance and Sexual Activity    Alcohol use: Never    Drug use: Never  "   Sexual activity: Not Currently     Partners: Male       Objective     Vital Signs:  /80 (BP Location: Right arm, Patient Position: Sitting, Cuff Size: Adult)   Pulse 62   Ht 162.6 cm (64\")   Wt 89.4 kg (197 lb)   SpO2 97%   BMI 33.81 kg/m²   Estimated body mass index is 33.81 kg/m² as calculated from the following:    Height as of this encounter: 162.6 cm (64\").    Weight as of this encounter: 89.4 kg (197 lb).         Physical Exam  Constitutional:       Appearance: Normal appearance. She is well-developed.   HENT:      Head: Normocephalic and atraumatic.   Eyes:      General: No scleral icterus.     Pupils: Pupils are equal, round, and reactive to light.   Cardiovascular:      Rate and Rhythm: Normal rate and regular rhythm.      Heart sounds: Normal heart sounds. No murmur heard.  Pulmonary:      Breath sounds: Normal breath sounds. No wheezing or rhonchi.   Musculoskeletal:      Right lower leg: No edema.      Left lower leg: No edema.   Skin:     Capillary Refill: Capillary refill takes less than 2 seconds.      Coloration: Skin is not cyanotic.      Nails: There is no clubbing.   Neurological:      Mental Status: She is alert and oriented to person, place, and time.      Motor: No weakness.      Gait: Gait normal.   Psychiatric:         Mood and Affect: Mood normal.         Behavior: Behavior is cooperative.         Thought Content: Thought content normal.         Cognition and Memory: Memory normal.                     Assessment and Plan     Assessment & Plan  Pure hypercholesterolemia   Would like her would like her to have cholesterol rechecked prior to next appointment to ensure that her new medications are going well.    Orders:    Hemoglobin A1c; Future    Lipid Panel; Future    Comprehensive Metabolic Panel; Future    CBC & Differential; Future    Elevated glucose  Add on A1c to next appointment.  Orders:    Hemoglobin A1c; Future    Lipid Panel; Future    Comprehensive Metabolic Panel; " Future    CBC & Differential; Future    PSVT (paroxysmal supraventricular tachycardia)  Monitor showed no arrhythmias.  Doing well.       Hypertension, essential  Control is stable.  Discussed adding diuretic but she is reluctant for now.  She is going to try to exercise more and bring her blood pressure down over time.  Cannot increase Bystolic because of symptomatic bradycardia at higher doses.                      Follow Up  Return in about 4 months (around 5/30/2025) for Recheck symptoms.    CAROL Brownlee MD  Cardiology and [facility]  01/30/2025     Please note that this explicitly excludes time spent on other separate billable services such as performing procedures or test interpretation, when applicable.    This note was created using dictation software which occasionally transcribes nonsensical phrases. Please contact the provider if any clarification is needed.

## 2025-02-28 ENCOUNTER — OFFICE VISIT (OUTPATIENT)
Dept: OBSTETRICS AND GYNECOLOGY | Facility: CLINIC | Age: 70
End: 2025-02-28
Payer: MEDICARE

## 2025-02-28 VITALS
WEIGHT: 202 LBS | DIASTOLIC BLOOD PRESSURE: 100 MMHG | SYSTOLIC BLOOD PRESSURE: 160 MMHG | HEIGHT: 64 IN | BODY MASS INDEX: 34.49 KG/M2

## 2025-02-28 DIAGNOSIS — Z01.419 WOMEN'S ANNUAL ROUTINE GYNECOLOGICAL EXAMINATION: Primary | ICD-10-CM

## 2025-02-28 DIAGNOSIS — N95.0 PMB (POSTMENOPAUSAL BLEEDING): ICD-10-CM

## 2025-02-28 NOTE — PROGRESS NOTES
Gynecologic Annual Exam Note        GYN Annual Exam     Chief Complaint   Patient presents with    Annual        Subjective     HPI  Malia Geiger is a 70 y.o. female, , who presents for annual well woman exam as a(n) established patient.  She is postmenopausal.  Patient denies vaginal bleeding. . There were no changes to her medical or surgical history since her last visit.. Marital Status: .  She is not currently sexually active STD testing recommendations have been explained to the patient and she declines STD testing.    Patient also had ultrasound follow up for PMB/proliferative endometrium.     The patient would like to discuss the following complaints today: none  Pt. Reports both stress and urge urinary incontinence.     Additional OB/GYN History     On HRT? No    Last Pap : 2022. Results: negative. HPV: negative.     Last Completed Pap Smear       This patient has no relevant Health Maintenance data.          History of abnormal Pap smear: no  Family history of uterine, colon, breast, or ovarian cancer: yes - MGM- female cancer  Performs monthly Self-Breast Exam: no  Last mammogram: 2024. Done at [facility]. There is not a copy in the chart.    Last Completed Mammogram       This patient has no relevant Health Maintenance data.          Last colonoscopy:     Last Completed Colonoscopy            COLORECTAL CANCER SCREENING (COLONOSCOPY - Every 10 Years) Tentatively due on 2019  Outside Procedure: COLONOSCOPY    2018  Colonoscopy, Scan    2018  Outside Procedure: COLONOSCOPY    2012  Outside Procedure: COLONOSCOPY                    Dexa: Unsure if she has completed  Exercises Regularly: no  Feelings of Anxiety or Depression: yes - intermittent      Tobacco Usage?: No       Current Outpatient Medications:     coenzyme Q10 100 MG capsule, Take 1 capsule by mouth Daily., Disp: , Rfl:     colestipol (COLESTID) 1 g tablet, , Disp: ,  Rfl:     ezetimibe (ZETIA) 10 MG tablet, Take 1 tablet by mouth Daily., Disp: , Rfl:     metFORMIN ER (GLUCOPHAGE-XR) 500 MG 24 hr tablet, TAKE 1 TABLET BY MOUTH WITH THE EVENING MEAL, Disp: , Rfl:     nebivolol (BYSTOLIC) 5 MG tablet, Take 1 tablet by mouth Daily., Disp: , Rfl:     Probiotic Product (PROBIOTIC ADVANCED PO), Take  by mouth., Disp: , Rfl:     valsartan (DIOVAN) 320 MG tablet, Take 1 tablet by mouth Daily., Disp: , Rfl:     vitamin B-12 (CYANOCOBALAMIN) 100 MCG tablet, Take 0.5 tablets by mouth Daily. (Patient taking differently: Take 1 tablet by mouth Daily.), Disp: , Rfl:     vitamin D3 125 MCG (5000 UT) capsule capsule, Take 1 capsule by mouth Daily., Disp: , Rfl:     Vonoprazan Fumarate (Voquezna) 10 MG tablet, Take 1 tablet by mouth Daily. Given samples, Disp: , Rfl:     Patient denies the need for medication refills today.    OB History          3    Para   3    Term                AB        Living             SAB        IAB        Ectopic        Molar        Multiple        Live Births                    Past Medical History:   Diagnosis Date    Arrhythmia     Arthritis     Atrial fibrillation     Chest pain     Chicken pox     Clostridium difficile infection     Remote apparent clostridium difficile infection with severe diarrhea and colonoscopy with fecal transplant, .      Colitis     Diabetes mellitus     Fatty liver     GERD (gastroesophageal reflux disease)     Heart murmur     Hiatal hernia     - probable GERD syndrome.      History of echocardiogram     History of PSVT (paroxysmal supraventricular tachycardia)     History of stress test     Hyperlipidemia     Hypertension     Measles     Menopause     Status post fecal microbiota transplant 2013        Past Surgical History:   Procedure Laterality Date    CARDIAC ABLATION      CATARACT EXTRACTION      COLONOSCOPY  2018    Dr. Brink    DILATATION AND CURETTAGE      History of D&C x2    KNEE MENISCAL REPAIR  "     LASIK      UPPER GASTROINTESTINAL ENDOSCOPY      Dr. Hernandez    VITRECTOMY         [facility] Maintenance   Topic Date Due    DXA SCAN  Never done    BMI FOLLOWUP  Never done    DIABETIC FOOT EXAM  Never done    DIABETIC EYE EXAM  Never done    URINE MICROALBUMIN-CREATININE RATIO (uACR)  Never done    Pneumococcal Vaccine 50+ (1 of 2 - PCV) Never done    ZOSTER VACCINE (1 of 2) Never done    LIPID PANEL  09/10/2016    MAMMOGRAM  06/03/2017    HEPATITIS C SCREENING  Never done    ANNUAL WELLNESS VISIT  Never done    HEMOGLOBIN A1C  03/23/2022    PAP SMEAR  07/01/2023    INFLUENZA VACCINE  Never done    COVID-19 Vaccine (5 - 2024-25 season) 09/01/2024    COLORECTAL CANCER SCREENING  01/01/2029    TDAP/TD VACCINES (2 - Td or Tdap) 07/14/2030       The additional following portions of the patient's history were reviewed and updated as appropriate: allergies, current medications, past family history, past medical history, past social history, past surgical history, and problem list.    Review of Systems   Constitutional: Negative.    Respiratory: Negative.     Cardiovascular: Negative.    Gastrointestinal: Negative.    Genitourinary:  Positive for urinary incontinence. Negative for vaginal bleeding.         I have reviewed and agree with the HPI, ROS, and historical information as entered above. Janene Gray, APRN           Objective   /100   Ht 162.6 cm (64\")   Wt 91.6 kg (202 lb)   BMI 34.67 kg/m²     Physical Exam  Constitutional:       Appearance: Normal appearance.   Neck:      Thyroid: No thyroid mass or thyromegaly.   Pulmonary:      Effort: Pulmonary effort is normal.   Chest:      Chest wall: No mass.   Breasts:     Right: Normal. No inverted nipple, mass, nipple discharge or skin change.      Left: Normal. No inverted nipple, mass, nipple discharge or skin change.   Abdominal:      General: There is no distension.      Palpations: Abdomen is soft. There is no mass.      Tenderness: There is no " abdominal tenderness.      Hernia: No hernia is present.   Genitourinary:     General: Normal vulva.      Labia:         Right: No rash.         Left: No rash.       Vagina: Normal.      Cervix: No cervical motion tenderness or lesion.      Uterus: Normal.       Adnexa: Right adnexa normal and left adnexa normal.        Right: No mass or tenderness.          Left: No mass or tenderness.     Neurological:      Mental Status: She is alert.            Assessment and Plan    Problem List Items Addressed This Visit          Genitourinary and Reproductive     Women's annual routine gynecological examination - Primary    Relevant Orders    LIQUID-BASED PAP SMEAR WITH HPV GENOTYPING IF ASCUS ([facility])    PMB (postmenopausal bleeding)    Overview     Proliferative endometrium on pathology 12/23. On short term progesterone with continued improvement of endometrial lining and no further bleeding. 2/25/25 endometrium 7 mm without further bleeding, this is improved from 9 mm. She will continue to do  ovarian cancer screening and no further U/S necessary unless further bleeding.           U/S today with continued improvement in endometrium. NO bleeding. Continue with  ovarian cancer screening and call for any bleeding.   Interventions and treatment for incontinence reviewed. She plans kegels for now.     GYN annual well woman exam.   Reviewed monthly self breast exams.  Instructed to call with lumps, pain, or breast discharge.  Yearly mammograms ordered.  Ordered mammogram today.  Reviewed [facility] ovarian cancer screening program.  Instructed on [place]egal exercises and gave patient educational information.  Return in about 1 year (around 2/28/2026) for Annual physical.  BP elevated today, needs to follow up with PCP    Janene Gray, PACO  02/28/2025

## 2025-03-04 LAB — REF LAB TEST METHOD: NORMAL

## 2025-05-01 ENCOUNTER — OFFICE VISIT (OUTPATIENT)
Dept: OBSTETRICS AND GYNECOLOGY | Facility: CLINIC | Age: 70
End: 2025-05-01
Payer: MEDICARE

## 2025-05-01 VITALS
WEIGHT: 197.4 LBS | SYSTOLIC BLOOD PRESSURE: 132 MMHG | DIASTOLIC BLOOD PRESSURE: 88 MMHG | HEIGHT: 64 IN | BODY MASS INDEX: 33.7 KG/M2

## 2025-05-01 DIAGNOSIS — R93.89 ENDOMETRIAL THICKENING ON ULTRASOUND: ICD-10-CM

## 2025-05-01 DIAGNOSIS — K42.9 UMBILICAL HERNIA WITHOUT OBSTRUCTION AND WITHOUT GANGRENE: ICD-10-CM

## 2025-05-01 DIAGNOSIS — N95.0 PMB (POSTMENOPAUSAL BLEEDING): Primary | ICD-10-CM

## 2025-05-01 NOTE — PROGRESS NOTES
Chief Complaint   Patient presents with    Postmenopausal bleeding           Subjective   HPI  Malia Geiger is a 70 y.o. female, . Her last LMP was No LMP recorded. Patient is postmenopausal. Who presents with c/o PMB tat started on Tuesday. Patient states it is like a light period and is passing small pieces of tissue when going to the bathroom.      At her last visit she was treated with expectant management. The patient reports additional symptoms as  lightheadedness and hot flashes .        Additional OB/GYN History     Last Pap : 2025 Negative  Last Completed Pap Smear            Upcoming       PAP SMEAR (Yearly) Next due on 2025  LIQUID-BASED PAP SMEAR WITH HPV GENOTYPING IF ASCUS ([facility])    2022  LIQUID-BASED PAP SMEAR, [facility] ([facility])                            Last mammogram:   Last Completed Mammogram    This patient has no relevant Health Maintenance data.         Tobacco Usage?: No   OB History          3    Para   3    Term                AB        Living             SAB        IAB        Ectopic        Molar        Multiple        Live Births                      Current Outpatient Medications:     coenzyme Q10 100 MG capsule, Take 1 capsule by mouth Daily., Disp: , Rfl:     colestipol (COLESTID) 1 g tablet, , Disp: , Rfl:     ezetimibe (ZETIA) 10 MG tablet, Take 1 tablet by mouth Daily., Disp: , Rfl:     metFORMIN ER (GLUCOPHAGE-XR) 500 MG 24 hr tablet, TAKE 1 TABLET BY MOUTH WITH THE EVENING MEAL, Disp: , Rfl:     nebivolol (BYSTOLIC) 5 MG tablet, Take 1 tablet by mouth Daily., Disp: , Rfl:     Probiotic Product (PROBIOTIC ADVANCED PO), Take  by mouth., Disp: , Rfl:     valsartan (DIOVAN) 320 MG tablet, Take 1 tablet by mouth Daily., Disp: , Rfl:     vitamin B-12 (CYANOCOBALAMIN) 100 MCG tablet, Take 0.5 tablets by mouth Daily. (Patient taking differently: Take 1 tablet by mouth Daily.), Disp: , Rfl:     vitamin D3 125  "MCG (5000 UT) capsule capsule, Take 1 capsule by mouth Daily., Disp: , Rfl:     Vonoprazan Fumarate (Voquezna) 10 MG tablet, Take 1 tablet by mouth Daily. Given samples, Disp: , Rfl:      Past Medical History:   Diagnosis Date    Arrhythmia     Arthritis     Atrial fibrillation     Chest pain     Chicken pox     Clostridium difficile infection     Remote apparent clostridium difficile infection with severe diarrhea and colonoscopy with fecal transplant, 2012.      Colitis     Diabetes mellitus     Fatty liver     GERD (gastroesophageal reflux disease)     Heart murmur     Hiatal hernia     - probable GERD syndrome.      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 Brink    DILATATION AND CURETTAGE      History of D&C x2    KNEE MENISCAL REPAIR      LASIK      UPPER GASTROINTESTINAL ENDOSCOPY      Dr. Hernandez    VITRECTOMY         The additional following portions of the patient's history were reviewed and updated as appropriate: allergies, current medications, past family history, past medical history, past social history, past surgical history, and problem list.    Review of Systems    I have reviewed and agree with the HPI, ROS, and historical information as entered above. Melissa Bustos MD      Objective   /88   Ht 162.6 cm (64.02\")   Wt 89.5 kg (197 lb 6.4 oz)   BMI 33.87 kg/m²     Physical Exam  Physical Exam:  General:  well developed; well nourished  no acute distress  mentation appropriate   Abdomen: soft, non-tender; no masses  no hepato-splenomegaly  Very small umbilical hernia above umbilicus, difficult to palpate well.   Pelvis: Clinical staff was present for " exam  External genitalia:  normal appearance of the external genitalia including Bartholin's and Silver Firs's glands.  :  urethral meatus normal; urethra normal:  Vaginal:  atrophic mucosal changes are present;  Cervix:  normal appearance. blood is seen coming from external cervical os;  Uterus:  normal size, shape and consistency. Mobile, diffuse TTP present but after EMB  Adnexa:  normal bimanual exam of the adnexa.      Assessment & Plan     Assessment     Problem List Items Addressed This Visit       PMB (postmenopausal bleeding) - Primary    Overview   Proliferative endometrium on pathology 12/23. On short term progesterone with continued improvement of endometrial lining and no further bleeding. 2/25/25 endometrium 7 mm without further bleeding, this is improved from 9 mm. She will continue to do [facility] ovarian cancer screening and no further U/S necessary unless further bleeding.   Previous pathology B9 proliferative with hyst, D&C, Myosure 12/1/23   Recurrent bleeding with tissue present.  EMB done today  Recommend TLH/BSO         Relevant Orders    US Non-ob Transvaginal    Case Request (Completed)    TISSUE EXAM, [facility] ([facility])    Endometrial thickening on ultrasound    Overview   Previous pathology B9 proliferative with hyst, D&C, Myosure 12/1/23   EMB done today  Recommend TLH/BSO         Relevant Orders    Case Request (Completed)    TISSUE EXAM, [facility] ([facility])    Umbilical hernia without obstruction and without gangrene    Overview   Very small, wondering about fixing at time of surgery.  Will look at it at the time.  As it is small, I don't think seeing gen surg necessary as I don't think they would consider mesh for something so small and she states she is not interested in mesh anyway.              Plan       Return for for preop appointment.        Melissa Bustos MD  05/01/2025

## 2025-05-01 NOTE — PROGRESS NOTES
"     Gynecologic Procedure Note        Endometrial Biopsy      Indications: Malia Geiger is a 70 y.o. , who presents today for evaluation of Postmenopausal Bleeding. As part of the evaluation, an endometrial biopsy was recommended.  The patient was noted to have Postmenopausal Bleeding.  Her LMP is No LMP recorded. Patient is postmenopausal. . After being presented with the risk, benefits, and specific detail of the procedure, the patient wished to proceed.  Written consent was obtained from patient.   Urine pregnancy test was Not indicated. Patient does not have an allergy to betadine or shellfish.     Procedure Details     Time out: immediate members of the procedure team and patient agree to the following: correct patient, correct site, correct procedure to be performed. Melissa Bustos MD      The patient was placed on the table in the dorsal lithotomy position.  She was draped in the appropriate manner.  A speculum was placed in the vagina.  The cervix was visualized and prepped with Betadine.  A tenaculum was placed on the anterior lip of the cervix for traction.  A small plastic 5 mm Pipelle syringe curette was inserted into the cervical canal.  The uterus was sounded to 7 cm's.  A vigorous four quadrant biopsy was performed, removing a medium amount of tissue.  The tissue was placed in Formalin and sent to Pathology.  The patient tolerated the procedure well and she reported mild cramping.  The tenaculum was removed from the cervix and the speculum was removed.  The patient was observed for 5 minutes.           Complications: none.     Procedures    Review of Systems  /88   Ht 162.6 cm (64.02\")   Wt 89.5 kg (197 lb 6.4 oz)   BMI 33.87 kg/m²       Plan:  Orders Placed This Encounter   Procedures    US Non-ob Transvaginal     Reason for Exam::   postmenopausal bleeding     Release to patient:   Routine Release [[id number]]       Problem List Items Addressed This Visit       PMB " (postmenopausal bleeding) - Primary    Overview   Proliferative endometrium on pathology 12/23. On short term progesterone with continued improvement of endometrial lining and no further bleeding. 2/25/25 endometrium 7 mm without further bleeding, this is improved from 9 mm. She will continue to do [facility] ovarian cancer screening and no further U/S necessary unless further bleeding.   Previous pathology B9 proliferative with hyst, D&C, Myosure 12/1/23   Recurrent bleeding with tissue present.  EMB done today  Recommend TLH/BSO         Relevant Orders    US Non-ob Transvaginal    Case Request (Completed)    TISSUE EXAM, [facility] ([facility]) (Completed)    Endometrial thickening on ultrasound    Overview   Previous pathology B9 proliferative with hyst, D&C, Myosure 12/1/23   EMB done today  Recommend TLH/BSO         Relevant Orders    Case Request (Completed)    TISSUE EXAM, [facility] ([facility]) (Completed)    Umbilical hernia without obstruction and without gangrene    Overview   Very small, wondering about fixing at time of surgery.  Will look at it at the time.  As it is small, I don't think seeing gen surg necessary as I don't think they would consider mesh for something so small and she states she is not interested in mesh anyway.                Instructions  [facility], we will call if abnormal.  Patient instructed to call the office if develops a fever of 100.4 or greater, vaginal bleeding heavier than a period, foul vaginal discharge or pain.     Melissa Bustos MD  05/01/2025

## 2025-05-02 ENCOUNTER — RESULTS FOLLOW-UP (OUTPATIENT)
Dept: OBSTETRICS AND GYNECOLOGY | Facility: CLINIC | Age: 70
End: 2025-05-02
Payer: MEDICARE

## 2025-05-02 LAB — REF LAB TEST METHOD: NORMAL

## 2025-05-06 ENCOUNTER — TELEPHONE (OUTPATIENT)
Dept: OBSTETRICS AND GYNECOLOGY | Facility: CLINIC | Age: 70
End: 2025-05-06

## 2025-05-06 NOTE — TELEPHONE ENCOUNTER
THIS IS FOR ARCHIE      Caller: Malia Geiger    Relationship: Self    Best call back number: [phone]    What is the best time to reach you: ANY    Who are you requesting to speak with (clinical staff, provider,  specific staff member): ARCHIE    Do you know the name of the person who called: ARCHIE    What was the call regarding: SURGERY SCHEDULING OR RESULTS    Is it okay if the provider responds through MyChart: YES

## 2025-05-08 ENCOUNTER — TELEPHONE (OUTPATIENT)
Dept: OBSTETRICS AND GYNECOLOGY | Facility: CLINIC | Age: 70
End: 2025-05-08
Payer: MEDICARE

## 2025-05-08 NOTE — TELEPHONE ENCOUNTER
Patient called the office. She saw Dr Bustos last week on 5/1/25 for PMB. She had a TVUS that showed endometrial thickening and an EMB was preformed. She is wanting to know what the endometrial lining measured at this most recent visit compared to the one prior. She had an U/S in 2/2025 and her lining measured 7.4 mm at that time. Her recent U/S was not yet signed off so I was unable to view it. U/S tech was able to look up her recent U/S and reported her lining measured 6.7 mm on 5/1/25. Patient also reports she has been trying to reach out to [facility] the  about scheduling her surgery. She states she had been contacted and offered 2 different dates for surgery and has tried to call back 3 times and has not heard anything back. She is unhappy with the communication amongst the office. Apologized to patient for this. Advised patient that I would send Nancy (another surgery scheduler) a message to see if either of them could contact her. She Vu. Advised to call back with other concerns or questions.       -ZULLY Moss

## 2025-05-08 NOTE — TELEPHONE ENCOUNTER
Hub staff attempted to follow warm transfer process and was unsuccessful     Caller: Malia Geiger    Relationship to patient: Self    Best call back number: [phone]     Patient is needing: PATIENT CALLING TO SPEAK TO [facility] REGARDING SOME QUESTIONS ABOUT A HYSTERECTOMY AND THE VAGINAL ULTRASOUND RESULTS.

## 2025-05-30 ENCOUNTER — TELEPHONE (OUTPATIENT)
Dept: CARDIOLOGY | Facility: CLINIC | Age: 70
End: 2025-05-30
Payer: MEDICARE

## 2025-05-30 NOTE — TELEPHONE ENCOUNTER
Caller: Malia Geiger    Relationship: Self    Best call back number: [phone]    What orders are you requesting (i.e. lab or imaging): LABS    In what timeframe would the patient need to come in: TODAY    Where will you receive your lab/imaging services: [facility]    Additional notes: PATIENT WOULD LIKE TO GO TODAY AND HAVE LABS DONE TODAY. OFFICE NUMBER : [phone].  PLEASE FAX OVER TODAY IF POSSIBLE

## 2025-05-30 NOTE — TELEPHONE ENCOUNTER
PATIENT CALLED STATING THAT PCP WILL NOT ACCEPT OUTSIDE LABS. SHE IS HAVING LABS DONE WITH PCP SPECIFIC TO THEIR OFFICE. PATIENT IS WANTING TO KNOW IF SHE CAN USE THOSE LABS TOWARD WHAT DR. MARIA NEEDS. CANNOT COME TO [place] UNTIL MONDAY. PLEASE CALL PATIENT WITH BEST NEXT STEPS.

## 2025-06-05 ENCOUNTER — OFFICE VISIT (OUTPATIENT)
Age: 70
End: 2025-06-05
Payer: MEDICARE

## 2025-06-05 ENCOUNTER — PATIENT ROUNDING (BHMG ONLY) (OUTPATIENT)
Dept: CARDIOLOGY | Facility: CLINIC | Age: 70
End: 2025-06-05
Payer: MEDICARE

## 2025-06-05 VITALS
DIASTOLIC BLOOD PRESSURE: 78 MMHG | BODY MASS INDEX: 34.15 KG/M2 | SYSTOLIC BLOOD PRESSURE: 120 MMHG | HEART RATE: 61 BPM | HEIGHT: 64 IN | OXYGEN SATURATION: 98 % | WEIGHT: 200 LBS

## 2025-06-05 DIAGNOSIS — I47.10 PSVT (PAROXYSMAL SUPRAVENTRICULAR TACHYCARDIA): ICD-10-CM

## 2025-06-05 DIAGNOSIS — E78.41 ELEVATED LIPOPROTEIN(A): ICD-10-CM

## 2025-06-05 DIAGNOSIS — I10 HYPERTENSION, ESSENTIAL: ICD-10-CM

## 2025-06-05 DIAGNOSIS — R06.02 SHORTNESS OF BREATH: ICD-10-CM

## 2025-06-05 DIAGNOSIS — G47.33 OBSTRUCTIVE SLEEP APNEA: ICD-10-CM

## 2025-06-05 DIAGNOSIS — E78.00 PURE HYPERCHOLESTEROLEMIA: Primary | ICD-10-CM

## 2025-06-05 DIAGNOSIS — Z01.810 PRE-OPERATIVE CARDIOVASCULAR EXAMINATION: ICD-10-CM

## 2025-06-05 RX ORDER — NEBIVOLOL 2.5 MG/1
2.5 TABLET ORAL DAILY
COMMUNITY

## 2025-06-05 NOTE — PROGRESS NOTES
..My name is Marcy Mcduffie and I am the Practice Manager for [facility] [place].    I would like to thank you for being a loyal patient. If you do not mind I would like to ask you a few questions about your recent visit with us.  Please feel free to reply if you wish to provide us with feedback on your first visit with our practice.    First, could you tell me what went well with your recent visit?    Secondly, we are always looking for ways to make our patients' experiences even better.  Do you have any recommendations on ways we may improve?    Finally, overall were you satisfied with your first visit to us as a [facility] facility?    In the next few days, you will be receiving a Patient Experience Survey.      Thank you for taking the time to answer a few questions today.  I hope you have a good day.

## 2025-06-05 NOTE — PROGRESS NOTES
[facility] Provider Note     Date:   2025   Name: Malia Geiger  :   1955  PCP: Silvia Corbin MD    Chief Complaint   Patient presents with    PSVT     Pt states she is here today for follow up PSVT. No chest pain or SOA. She states she has had a couple episodes of fluttering that resolved quickly.     Hypertension     Pt states she is here today for follow up HTN. She states she had labs drawn with Dr. Corbin this week.        Subjective     History of Present Illness  Malia Geiger is a 70 y.o. female who presents today for Follow up with hypertension and PSVT.  Has had no chest pain. Has had  episodes of fluttering that resolved quickly. No shortness of breath associated.    Reports that has shortness of breath when exerting self. Usually on an incline.  Reports that B/P is better.  Reports that is always fatigued. Does not sleep at night and does not sleep deeply. Had quit wearing C-Pap because her biggest problem is that she was waking up taking it off and once awakened could not go back to sleep after that.  Reports no dizziness. No syncope. No falls.  Taking D3 over the counter. Seen on facebook that she should be taking Vit K with it and would like recommendations.   Did have lab work completed as requested at Dr Corbin's office. In [facility] under care coordination/medical records on 6-3.  Reports that is going to have to have a hysterectomy. Continues with breakthrough bleeding and has tried hormone therapy and D&C before. Surgery is scheduled for 8-15-25      Has no new issues or concerns.      2025 Updated      1. PAT ablation by Dr. Tse    Allergies   Allergen Reactions    Atorvastatin Myalgia    Erythromycin GI Intolerance    Lisinopril Cough    Phenergan [Promethazine] Confusion    Simvastatin Myalgia    Voltaren [Diclofenac Sodium] GI Intolerance    Penicillins Rash    Sulfa Antibiotics Rash       Current Outpatient Medications:     coenzyme Q10  100 MG capsule, Take 1 capsule by mouth Daily., Disp: , Rfl:     ezetimibe (ZETIA) 10 MG tablet, Take 1 tablet by mouth Daily., Disp: , Rfl:     metFORMIN ER (GLUCOPHAGE-XR) 500 MG 24 hr tablet, TAKE 1 TABLET BY MOUTH WITH THE EVENING MEAL, Disp: , Rfl:     nebivolol (BYSTOLIC) 2.5 MG tablet, Take 1 tablet by mouth Daily., Disp: , Rfl:     Probiotic Product (PROBIOTIC ADVANCED PO), Take  by mouth., Disp: , Rfl:     valsartan (DIOVAN) 320 MG tablet, Take 1 tablet by mouth Daily., Disp: , Rfl:     vitamin B-12 (CYANOCOBALAMIN) 100 MCG tablet, Take 0.5 tablets by mouth Daily. (Patient taking differently: Take 1 tablet by mouth Daily.), Disp: , Rfl:     vitamin D3 125 MCG (5000 UT) capsule capsule, Take 1 capsule by mouth Daily., Disp: , Rfl:     Vonoprazan Fumarate (Voquezna) 10 MG tablet, Take 1 tablet by mouth Daily. Given samples, Disp: , Rfl:     Evolocumab (REPATHA) solution prefilled syringe injection, Inject 1 mL under the skin into the appropriate area as directed Every 14 (Fourteen) Days., Disp: 1 mL, Rfl: 12    Past Medical History:   Diagnosis Date    Arrhythmia     Arthritis     Atrial fibrillation     Chest pain     Chicken pox     Clostridium difficile infection     Remote apparent clostridium difficile infection with severe diarrhea and colonoscopy with fecal transplant, 2012.      Colitis     Diabetes mellitus     Fatty liver     GERD (gastroesophageal reflux disease)     Heart murmur     Hiatal hernia     - probable GERD syndrome.      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 Brink    DILATATION AND CURETTAGE      History of D&C x2    KNEE MENISCAL REPAIR      LASIK      UPPER  "GASTROINTESTINAL ENDOSCOPY      Dr. Hernandez    VITRECTOMY       Family History   Problem Relation Age of Onset    Hyperlipidemia Mother     Hypertension Mother     Heart attack Mother     Diabetes Mother     Lymphoma Father     Cancer Maternal Grandmother     Leukemia Maternal Grandfather     No Known Problems Paternal Grandmother     No Known Problems Paternal Grandfather      Social History     Socioeconomic History    Marital status:    Tobacco Use    Smoking status: Never     Passive exposure: Never    Smokeless tobacco: Never   Vaping Use    Vaping status: Never Used   Substance and Sexual Activity    Alcohol use: Never    Drug use: Never    Sexual activity: Not Currently     Partners: Male       Objective     Vital Signs:  /78 (BP Location: Right arm, Patient Position: Sitting, Cuff Size: Adult)   Pulse 61   Ht 162.6 cm (64.02\")   Wt 90.7 kg (200 lb)   SpO2 98%   BMI 34.31 kg/m²   Estimated body mass index is 34.31 kg/m² as calculated from the following:    Height as of this encounter: 162.6 cm (64.02\").    Weight as of this encounter: 90.7 kg (200 lb).         Physical Exam  Constitutional:       Appearance: Normal appearance. She is well-developed.   HENT:      Head: Normocephalic and atraumatic.   Eyes:      General: No scleral icterus.     Pupils: Pupils are equal, round, and reactive to light.   Cardiovascular:      Rate and Rhythm: Normal rate and regular rhythm.      Heart sounds: Normal heart sounds. No murmur heard.  Pulmonary:      Breath sounds: Normal breath sounds. No wheezing or rhonchi.   Musculoskeletal:      Right lower leg: No edema.      Left lower leg: No edema.   Skin:     Capillary Refill: Capillary refill takes less than 2 seconds.      Coloration: Skin is not cyanotic.      Nails: There is no clubbing.   Neurological:      Mental Status: She is alert and oriented to person, place, and time.      Motor: No weakness.      Gait: Gait normal.   Psychiatric:         Mood " and Affect: Mood normal.         Behavior: Behavior is cooperative.         Thought Content: Thought content normal.         Cognition and Memory: Memory normal.                     Assessment and Plan     Diagnoses and all orders for this visit:    1. Pure hypercholesterolemia (Primary)  -     Evolocumab (REPATHA) solution prefilled syringe injection; Inject 1 mL under the skin into the appropriate area as directed Every 14 (Fourteen) Days.  Dispense: 1 mL; Refill: 12    2. Hypertension, essential    3. PSVT (paroxysmal supraventricular tachycardia)    4. Shortness of breath    5. Obstructive sleep apnea    6. Elevated lipoprotein(a)  -     Evolocumab (REPATHA) solution prefilled syringe injection; Inject 1 mL under the skin into the appropriate area as directed Every 14 (Fourteen) Days.  Dispense: 1 mL; Refill: 12    7. Pre-operative cardiovascular examination            PLAN  -doing well on bystolic, no more bradycardia, flutter does not seem like enough to treat at this time  -Lpa is >75 (77) and LDL is 127 while on zetia, cannot take at least 2 statins, and having mild myalgias with zetia, will send in repatha.   -A1c is 5.8 so much better then in the 6 range.   -breathing seems better to patient  -hysterectomy coming up, in good cardiac condition and well optimized for that. Proceed as planned.   - discussed titration and mouth guard for severe ANTHONY, patient declines right now but will readdress after hysterectomy.   -will repeat cholesterol and lpa in October/november    Follow Up  Return in about 4 months (around 10/5/2025).    CAROL Brownlee MD  Cardiology and Sleep [facility]  06/05/2025      Please note that this explicitly excludes time spent on other separate billable services such as performing procedures or test interpretation, when applicable.    This note was created using dictation software which occasionally transcribes nonsensical phrases. Please contact the provider if any clarification is  needed.

## 2025-06-06 DIAGNOSIS — R73.09 ELEVATED GLUCOSE: ICD-10-CM

## 2025-06-06 DIAGNOSIS — E78.00 PURE HYPERCHOLESTEROLEMIA: ICD-10-CM

## 2025-08-07 ENCOUNTER — TELEPHONE (OUTPATIENT)
Dept: CARDIOLOGY | Facility: CLINIC | Age: 70
End: 2025-08-07
Payer: MEDICARE

## 2025-08-07 DIAGNOSIS — E78.00 PURE HYPERCHOLESTEROLEMIA: Primary | ICD-10-CM

## 2025-08-07 DIAGNOSIS — E78.41 ELEVATED LIPOPROTEIN(A): ICD-10-CM

## 2025-08-10 ENCOUNTER — PREP FOR SURGERY (OUTPATIENT)
Dept: OTHER | Facility: HOSPITAL | Age: 70
End: 2025-08-10
Payer: MEDICARE

## 2025-08-10 DIAGNOSIS — N95.0 PMB (POSTMENOPAUSAL BLEEDING): ICD-10-CM

## 2025-08-10 DIAGNOSIS — R93.89 ENDOMETRIAL THICKENING ON ULTRASOUND: Primary | ICD-10-CM

## 2025-08-10 RX ORDER — SCOPOLAMINE 1 MG/3D
1 PATCH, EXTENDED RELEASE TRANSDERMAL CONTINUOUS
OUTPATIENT
Start: 2025-08-10 | End: 2025-08-13

## 2025-08-10 RX ORDER — GABAPENTIN 300 MG/1
600 CAPSULE ORAL ONCE
OUTPATIENT
Start: 2025-08-10 | End: 2025-08-10

## 2025-08-10 RX ORDER — SODIUM CHLORIDE 0.9 % (FLUSH) 0.9 %
3 SYRINGE (ML) INJECTION EVERY 12 HOURS SCHEDULED
OUTPATIENT
Start: 2025-08-10

## 2025-08-10 RX ORDER — ACETAMINOPHEN 500 MG
1000 TABLET ORAL ONCE
OUTPATIENT
Start: 2025-08-10 | End: 2025-08-10

## 2025-08-10 RX ORDER — SODIUM CHLORIDE 0.9 % (FLUSH) 0.9 %
10 SYRINGE (ML) INJECTION AS NEEDED
OUTPATIENT
Start: 2025-08-10

## 2025-08-10 RX ORDER — SODIUM CHLORIDE 9 MG/ML
40 INJECTION, SOLUTION INTRAVENOUS AS NEEDED
OUTPATIENT
Start: 2025-08-10

## 2025-08-10 RX ORDER — PHENAZOPYRIDINE HYDROCHLORIDE 100 MG/1
200 TABLET, FILM COATED ORAL ONCE
OUTPATIENT
Start: 2025-08-10 | End: 2025-08-10

## 2025-08-10 RX ORDER — SODIUM CHLORIDE, SODIUM LACTATE, POTASSIUM CHLORIDE, CALCIUM CHLORIDE 600; 310; 30; 20 MG/100ML; MG/100ML; MG/100ML; MG/100ML
125 INJECTION, SOLUTION INTRAVENOUS CONTINUOUS
OUTPATIENT
Start: 2025-08-10 | End: 2025-08-18

## 2025-08-11 RX ORDER — ALIROCUMAB 150 MG/ML
150 INJECTION, SOLUTION SUBCUTANEOUS
Qty: 2.24 ML | Refills: 5 | Status: SHIPPED | OUTPATIENT
Start: 2025-08-11

## 2025-08-12 ENCOUNTER — OFFICE VISIT (OUTPATIENT)
Dept: OBSTETRICS AND GYNECOLOGY | Facility: CLINIC | Age: 70
End: 2025-08-12
Payer: MEDICARE

## 2025-08-12 ENCOUNTER — PRE-ADMISSION TESTING (OUTPATIENT)
Dept: PREADMISSION TESTING | Facility: HOSPITAL | Age: 70
End: 2025-08-12
Payer: MEDICARE

## 2025-08-12 VITALS
BODY MASS INDEX: 34.31 KG/M2 | HEIGHT: 64 IN | WEIGHT: 201 LBS | DIASTOLIC BLOOD PRESSURE: 92 MMHG | SYSTOLIC BLOOD PRESSURE: 142 MMHG

## 2025-08-12 DIAGNOSIS — Z01.89 LABORATORY TEST: Primary | ICD-10-CM

## 2025-08-12 DIAGNOSIS — R93.89 ENDOMETRIAL THICKENING ON ULTRASOUND: ICD-10-CM

## 2025-08-12 DIAGNOSIS — R93.89 ENDOMETRIAL THICKENING ON ULTRASOUND: Primary | ICD-10-CM

## 2025-08-12 DIAGNOSIS — N95.0 PMB (POSTMENOPAUSAL BLEEDING): ICD-10-CM

## 2025-08-12 LAB
ABO GROUP BLD: NORMAL
ANION GAP SERPL CALCULATED.3IONS-SCNC: 9 MMOL/L (ref 5–15)
BASOPHILS # BLD AUTO: 0.11 10*3/MM3 (ref 0–0.2)
BASOPHILS NFR BLD AUTO: 1.4 % (ref 0–1.5)
BUN SERPL-MCNC: 11.1 MG/DL (ref 8–23)
BUN/CREAT SERPL: 13.2 (ref 7–25)
CALCIUM SPEC-SCNC: 9.9 MG/DL (ref 8.6–10.5)
CHLORIDE SERPL-SCNC: 104 MMOL/L (ref 98–107)
CO2 SERPL-SCNC: 27 MMOL/L (ref 22–29)
CREAT SERPL-MCNC: 0.84 MG/DL (ref 0.57–1)
DEPRECATED RDW RBC AUTO: 44.2 FL (ref 37–54)
EGFRCR SERPLBLD CKD-EPI 2021: 75.3 ML/MIN/1.73
EOSINOPHIL # BLD AUTO: 0.09 10*3/MM3 (ref 0–0.4)
EOSINOPHIL NFR BLD AUTO: 1.2 % (ref 0.3–6.2)
ERYTHROCYTE [DISTWIDTH] IN BLOOD BY AUTOMATED COUNT: 13.6 % (ref 12.3–15.4)
GLUCOSE SERPL-MCNC: 95 MG/DL (ref 65–99)
HCT VFR BLD AUTO: 46.9 % (ref 34–46.6)
HGB BLD-MCNC: 14.8 G/DL (ref 12–15.9)
IMM GRANULOCYTES # BLD AUTO: 0.04 10*3/MM3 (ref 0–0.05)
IMM GRANULOCYTES NFR BLD AUTO: 0.5 % (ref 0–0.5)
LYMPHOCYTES # BLD AUTO: 2.03 10*3/MM3 (ref 0.7–3.1)
LYMPHOCYTES NFR BLD AUTO: 26.6 % (ref 19.6–45.3)
MCH RBC QN AUTO: 27.9 PG (ref 26.6–33)
MCHC RBC AUTO-ENTMCNC: 31.6 G/DL (ref 31.5–35.7)
MCV RBC AUTO: 88.5 FL (ref 79–97)
MONOCYTES # BLD AUTO: 0.54 10*3/MM3 (ref 0.1–0.9)
MONOCYTES NFR BLD AUTO: 7.1 % (ref 5–12)
NEUTROPHILS NFR BLD AUTO: 4.82 10*3/MM3 (ref 1.7–7)
NEUTROPHILS NFR BLD AUTO: 63.2 % (ref 42.7–76)
NRBC BLD AUTO-RTO: 0 /100 WBC (ref 0–0.2)
PLATELET # BLD AUTO: 290 10*3/MM3 (ref 140–450)
PMV BLD AUTO: 9.4 FL (ref 6–12)
POTASSIUM SERPL-SCNC: 4.9 MMOL/L (ref 3.5–5.2)
RBC # BLD AUTO: 5.3 10*6/MM3 (ref 3.77–5.28)
RH BLD: POSITIVE
SODIUM SERPL-SCNC: 140 MMOL/L (ref 136–145)
WBC NRBC COR # BLD AUTO: 7.63 10*3/MM3 (ref 3.4–10.8)

## 2025-08-12 PROCEDURE — 80048 BASIC METABOLIC PNL TOTAL CA: CPT

## 2025-08-12 PROCEDURE — 93005 ELECTROCARDIOGRAM TRACING: CPT

## 2025-08-12 PROCEDURE — 86900 BLOOD TYPING SEROLOGIC ABO: CPT

## 2025-08-12 PROCEDURE — 36415 COLL VENOUS BLD VENIPUNCTURE: CPT

## 2025-08-12 PROCEDURE — 85025 COMPLETE CBC W/AUTO DIFF WBC: CPT

## 2025-08-12 PROCEDURE — 86901 BLOOD TYPING SEROLOGIC RH(D): CPT

## 2025-08-13 LAB
QT INTERVAL: 442 MS
QTC INTERVAL: 407 MS

## 2025-08-14 ENCOUNTER — ANESTHESIA EVENT (OUTPATIENT)
Dept: PERIOP | Facility: HOSPITAL | Age: 70
End: 2025-08-14
Payer: MEDICARE

## 2025-08-15 ENCOUNTER — ANESTHESIA (OUTPATIENT)
Dept: PERIOP | Facility: HOSPITAL | Age: 70
End: 2025-08-15
Payer: MEDICARE

## 2025-08-15 ENCOUNTER — HOSPITAL ENCOUNTER (OUTPATIENT)
Facility: HOSPITAL | Age: 70
Discharge: HOME | End: 2025-08-16
Attending: OBSTETRICS & GYNECOLOGY | Admitting: OBSTETRICS & GYNECOLOGY
Payer: MEDICARE

## 2025-08-15 DIAGNOSIS — N95.0 PMB (POSTMENOPAUSAL BLEEDING): ICD-10-CM

## 2025-08-15 DIAGNOSIS — R93.89 ENDOMETRIAL THICKENING ON ULTRASOUND: ICD-10-CM

## 2025-08-15 LAB
ABO GROUP BLD: NORMAL
BLD GP AB SCN SERPL QL: NEGATIVE
GLUCOSE BLDC GLUCOMTR-MCNC: 99 MG/DL (ref 70–130)
QT INTERVAL: 434 MS
QTC INTERVAL: 422 MS
RH BLD: POSITIVE
T&S EXPIRATION DATE: NORMAL

## 2025-08-15 PROCEDURE — 25010000002 SUGAMMADEX 200 MG/2ML SOLUTION: Performed by: NURSE ANESTHETIST, CERTIFIED REGISTERED

## 2025-08-15 PROCEDURE — 25010000002 CEFAZOLIN PER 500 MG: Performed by: OBSTETRICS & GYNECOLOGY

## 2025-08-15 PROCEDURE — 25010000002 ONDANSETRON PER 1 MG: Performed by: OBSTETRICS & GYNECOLOGY

## 2025-08-15 PROCEDURE — 82948 REAGENT STRIP/BLOOD GLUCOSE: CPT

## 2025-08-15 PROCEDURE — 25010000002 LIDOCAINE PF 1% 1 % SOLUTION: Performed by: NURSE ANESTHETIST, CERTIFIED REGISTERED

## 2025-08-15 PROCEDURE — 25810000003 LACTATED RINGERS PER 1000 ML: Performed by: OBSTETRICS & GYNECOLOGY

## 2025-08-15 PROCEDURE — 58571 TLH W/T/O 250 G OR LESS: CPT | Performed by: OBSTETRICS & GYNECOLOGY

## 2025-08-15 PROCEDURE — 93005 ELECTROCARDIOGRAM TRACING: CPT | Performed by: ANESTHESIOLOGY

## 2025-08-15 PROCEDURE — 86901 BLOOD TYPING SEROLOGIC RH(D): CPT | Performed by: OBSTETRICS & GYNECOLOGY

## 2025-08-15 PROCEDURE — 25810000003 SODIUM CHLORIDE PER 500 ML: Performed by: OBSTETRICS & GYNECOLOGY

## 2025-08-15 PROCEDURE — 25010000002 PROPOFOL 10 MG/ML EMULSION: Performed by: NURSE ANESTHETIST, CERTIFIED REGISTERED

## 2025-08-15 PROCEDURE — 86900 BLOOD TYPING SEROLOGIC ABO: CPT | Performed by: OBSTETRICS & GYNECOLOGY

## 2025-08-15 PROCEDURE — 88307 TISSUE EXAM BY PATHOLOGIST: CPT | Performed by: OBSTETRICS & GYNECOLOGY

## 2025-08-15 PROCEDURE — 25010000002 ONDANSETRON PER 1 MG: Performed by: NURSE ANESTHETIST, CERTIFIED REGISTERED

## 2025-08-15 PROCEDURE — 25010000002 FENTANYL CITRATE (PF) 100 MCG/2ML SOLUTION: Performed by: NURSE ANESTHETIST, CERTIFIED REGISTERED

## 2025-08-15 PROCEDURE — 25010000002 PROCHLORPERAZINE 10 MG/2ML SOLUTION: Performed by: OBSTETRICS & GYNECOLOGY

## 2025-08-15 PROCEDURE — 25010000002 FENTANYL CITRATE (PF) 50 MCG/ML SOLUTION

## 2025-08-15 PROCEDURE — 86850 RBC ANTIBODY SCREEN: CPT | Performed by: OBSTETRICS & GYNECOLOGY

## 2025-08-15 PROCEDURE — 25810000003 LACTATED RINGERS PER 1000 ML: Performed by: NURSE ANESTHETIST, CERTIFIED REGISTERED

## 2025-08-15 PROCEDURE — 25010000002 GLYCOPYRROLATE 1 MG/5ML SOLUTION: Performed by: NURSE ANESTHETIST, CERTIFIED REGISTERED

## 2025-08-15 PROCEDURE — 25010000002 HYDROMORPHONE 1 MG/ML SOLUTION

## 2025-08-15 PROCEDURE — 25010000002 DEXAMETHASONE PER 1 MG: Performed by: NURSE ANESTHETIST, CERTIFIED REGISTERED

## 2025-08-15 DEVICE — SEAL HEMO SURG ARISTA/AH ABS/PWDR 3GM: Type: IMPLANTABLE DEVICE | Site: UTERUS | Status: FUNCTIONAL

## 2025-08-15 RX ORDER — SACCHAROMYCES BOULARDII 250 MG
500 CAPSULE ORAL DAILY
Status: DISCONTINUED | OUTPATIENT
Start: 2025-08-15 | End: 2025-08-16 | Stop reason: HOSPADM

## 2025-08-15 RX ORDER — PROCHLORPERAZINE MALEATE 5 MG/1
5 TABLET ORAL EVERY 6 HOURS PRN
Status: DISCONTINUED | OUTPATIENT
Start: 2025-08-15 | End: 2025-08-16 | Stop reason: HOSPADM

## 2025-08-15 RX ORDER — OXYCODONE HYDROCHLORIDE 10 MG/1
10 TABLET ORAL EVERY 4 HOURS PRN
Status: DISCONTINUED | OUTPATIENT
Start: 2025-08-15 | End: 2025-08-16 | Stop reason: HOSPADM

## 2025-08-15 RX ORDER — OXYCODONE HYDROCHLORIDE 5 MG/1
5 TABLET ORAL EVERY 4 HOURS PRN
Status: DISCONTINUED | OUTPATIENT
Start: 2025-08-15 | End: 2025-08-16 | Stop reason: HOSPADM

## 2025-08-15 RX ORDER — NAPROXEN 250 MG/1
250 TABLET ORAL 3 TIMES DAILY PRN
Qty: 30 TABLET | Refills: 0 | Status: SHIPPED | OUTPATIENT
Start: 2025-08-15

## 2025-08-15 RX ORDER — ONDANSETRON 2 MG/ML
4 INJECTION INTRAMUSCULAR; INTRAVENOUS ONCE AS NEEDED
Status: DISCONTINUED | OUTPATIENT
Start: 2025-08-15 | End: 2025-08-15 | Stop reason: HOSPADM

## 2025-08-15 RX ORDER — SODIUM CHLORIDE 9 MG/ML
40 INJECTION, SOLUTION INTRAVENOUS AS NEEDED
Status: DISCONTINUED | OUTPATIENT
Start: 2025-08-15 | End: 2025-08-15 | Stop reason: HOSPADM

## 2025-08-15 RX ORDER — ACETAMINOPHEN 500 MG
1000 TABLET ORAL ONCE
Status: COMPLETED | OUTPATIENT
Start: 2025-08-15 | End: 2025-08-15

## 2025-08-15 RX ORDER — SODIUM CHLORIDE 0.9 % (FLUSH) 0.9 %
10 SYRINGE (ML) INJECTION AS NEEDED
Status: DISCONTINUED | OUTPATIENT
Start: 2025-08-15 | End: 2025-08-15 | Stop reason: HOSPADM

## 2025-08-15 RX ORDER — PROPOFOL 10 MG/ML
VIAL (ML) INTRAVENOUS AS NEEDED
Status: DISCONTINUED | OUTPATIENT
Start: 2025-08-15 | End: 2025-08-15 | Stop reason: SURG

## 2025-08-15 RX ORDER — NEBIVOLOL 2.5 MG/1
2.5 TABLET ORAL
Status: DISCONTINUED | OUTPATIENT
Start: 2025-08-15 | End: 2025-08-16 | Stop reason: HOSPADM

## 2025-08-15 RX ORDER — NAPROXEN 250 MG/1
250 TABLET ORAL 2 TIMES DAILY WITH MEALS
Status: DISCONTINUED | OUTPATIENT
Start: 2025-08-15 | End: 2025-08-16 | Stop reason: HOSPADM

## 2025-08-15 RX ORDER — GABAPENTIN 100 MG/1
100 CAPSULE ORAL 3 TIMES DAILY
Status: DISCONTINUED | OUTPATIENT
Start: 2025-08-15 | End: 2025-08-16 | Stop reason: HOSPADM

## 2025-08-15 RX ORDER — FENTANYL CITRATE 50 UG/ML
INJECTION, SOLUTION INTRAMUSCULAR; INTRAVENOUS AS NEEDED
Status: DISCONTINUED | OUTPATIENT
Start: 2025-08-15 | End: 2025-08-15 | Stop reason: SURG

## 2025-08-15 RX ORDER — SODIUM CHLORIDE, SODIUM LACTATE, POTASSIUM CHLORIDE, CALCIUM CHLORIDE 600; 310; 30; 20 MG/100ML; MG/100ML; MG/100ML; MG/100ML
125 INJECTION, SOLUTION INTRAVENOUS CONTINUOUS
Status: DISCONTINUED | OUTPATIENT
Start: 2025-08-15 | End: 2025-08-15

## 2025-08-15 RX ORDER — MIDAZOLAM HYDROCHLORIDE 1 MG/ML
0.5 INJECTION, SOLUTION INTRAMUSCULAR; INTRAVENOUS
Status: DISCONTINUED | OUTPATIENT
Start: 2025-08-15 | End: 2025-08-15 | Stop reason: HOSPADM

## 2025-08-15 RX ORDER — LIDOCAINE HYDROCHLORIDE 10 MG/ML
0.5 INJECTION, SOLUTION EPIDURAL; INFILTRATION; INTRACAUDAL; PERINEURAL ONCE AS NEEDED
Status: DISCONTINUED | OUTPATIENT
Start: 2025-08-15 | End: 2025-08-15 | Stop reason: HOSPADM

## 2025-08-15 RX ORDER — HYDROMORPHONE HYDROCHLORIDE 1 MG/ML
0.5 INJECTION, SOLUTION INTRAMUSCULAR; INTRAVENOUS; SUBCUTANEOUS
Status: DISCONTINUED | OUTPATIENT
Start: 2025-08-15 | End: 2025-08-15 | Stop reason: HOSPADM

## 2025-08-15 RX ORDER — ONDANSETRON 2 MG/ML
INJECTION INTRAMUSCULAR; INTRAVENOUS AS NEEDED
Status: DISCONTINUED | OUTPATIENT
Start: 2025-08-15 | End: 2025-08-15 | Stop reason: SURG

## 2025-08-15 RX ORDER — SCOPOLAMINE 1 MG/3D
1 PATCH, EXTENDED RELEASE TRANSDERMAL CONTINUOUS
Status: DISCONTINUED | OUTPATIENT
Start: 2025-08-15 | End: 2025-08-15

## 2025-08-15 RX ORDER — FAMOTIDINE 20 MG/1
20 TABLET, FILM COATED ORAL ONCE
Status: COMPLETED | OUTPATIENT
Start: 2025-08-15 | End: 2025-08-15

## 2025-08-15 RX ORDER — GABAPENTIN 100 MG/1
100 CAPSULE ORAL 3 TIMES DAILY
Qty: 6 CAPSULE | Refills: 0 | Status: SHIPPED | OUTPATIENT
Start: 2025-08-15 | End: 2025-08-17

## 2025-08-15 RX ORDER — SODIUM CHLORIDE 9 MG/ML
INJECTION, SOLUTION INTRAVENOUS AS NEEDED
Status: DISCONTINUED | OUTPATIENT
Start: 2025-08-15 | End: 2025-08-15 | Stop reason: HOSPADM

## 2025-08-15 RX ORDER — KETOROLAC TROMETHAMINE 15 MG/ML
15 INJECTION, SOLUTION INTRAMUSCULAR; INTRAVENOUS EVERY 6 HOURS PRN
Status: DISCONTINUED | OUTPATIENT
Start: 2025-08-15 | End: 2025-08-16 | Stop reason: HOSPADM

## 2025-08-15 RX ORDER — OXYCODONE HYDROCHLORIDE 5 MG/1
5-10 TABLET ORAL EVERY 4 HOURS PRN
Qty: 15 TABLET | Refills: 0 | Status: SHIPPED | OUTPATIENT
Start: 2025-08-15

## 2025-08-15 RX ORDER — SODIUM CHLORIDE 0.9 % (FLUSH) 0.9 %
10 SYRINGE (ML) INJECTION EVERY 12 HOURS SCHEDULED
Status: DISCONTINUED | OUTPATIENT
Start: 2025-08-15 | End: 2025-08-15 | Stop reason: HOSPADM

## 2025-08-15 RX ORDER — PSEUDOEPHEDRINE HCL 30 MG
100 TABLET ORAL 2 TIMES DAILY
Qty: 100 CAPSULE | Refills: 2 | Status: SHIPPED | OUTPATIENT
Start: 2025-08-15

## 2025-08-15 RX ORDER — BUPIVACAINE HCL/EPINEPHRINE 0.25-.0005
VIAL (ML) INJECTION AS NEEDED
Status: DISCONTINUED | OUTPATIENT
Start: 2025-08-15 | End: 2025-08-15 | Stop reason: HOSPADM

## 2025-08-15 RX ORDER — FENTANYL CITRATE 50 UG/ML
50 INJECTION, SOLUTION INTRAMUSCULAR; INTRAVENOUS
Status: DISCONTINUED | OUTPATIENT
Start: 2025-08-15 | End: 2025-08-15 | Stop reason: HOSPADM

## 2025-08-15 RX ORDER — DIPHENHYDRAMINE HYDROCHLORIDE 50 MG/ML
25 INJECTION, SOLUTION INTRAMUSCULAR; INTRAVENOUS NIGHTLY PRN
Status: DISCONTINUED | OUTPATIENT
Start: 2025-08-15 | End: 2025-08-16 | Stop reason: HOSPADM

## 2025-08-15 RX ORDER — FAMOTIDINE 10 MG/ML
20 INJECTION, SOLUTION INTRAVENOUS ONCE
Status: DISCONTINUED | OUTPATIENT
Start: 2025-08-15 | End: 2025-08-15 | Stop reason: HOSPADM

## 2025-08-15 RX ORDER — PHENAZOPYRIDINE HYDROCHLORIDE 100 MG/1
200 TABLET, FILM COATED ORAL ONCE
Status: COMPLETED | OUTPATIENT
Start: 2025-08-15 | End: 2025-08-15

## 2025-08-15 RX ORDER — SODIUM CHLORIDE 0.9 % (FLUSH) 0.9 %
3 SYRINGE (ML) INJECTION EVERY 12 HOURS SCHEDULED
Status: DISCONTINUED | OUTPATIENT
Start: 2025-08-15 | End: 2025-08-15 | Stop reason: HOSPADM

## 2025-08-15 RX ORDER — SODIUM CHLORIDE, SODIUM LACTATE, POTASSIUM CHLORIDE, CALCIUM CHLORIDE 600; 310; 30; 20 MG/100ML; MG/100ML; MG/100ML; MG/100ML
9 INJECTION, SOLUTION INTRAVENOUS CONTINUOUS
Status: DISCONTINUED | OUTPATIENT
Start: 2025-08-16 | End: 2025-08-16 | Stop reason: HOSPADM

## 2025-08-15 RX ORDER — DEXTROSE MONOHYDRATE, SODIUM CHLORIDE, AND POTASSIUM CHLORIDE 50; 1.49; 4.5 G/1000ML; G/1000ML; G/1000ML
100 INJECTION, SOLUTION INTRAVENOUS CONTINUOUS
Status: DISCONTINUED | OUTPATIENT
Start: 2025-08-15 | End: 2025-08-16 | Stop reason: HOSPADM

## 2025-08-15 RX ORDER — LIDOCAINE HYDROCHLORIDE 10 MG/ML
INJECTION, SOLUTION EPIDURAL; INFILTRATION; INTRACAUDAL; PERINEURAL AS NEEDED
Status: DISCONTINUED | OUTPATIENT
Start: 2025-08-15 | End: 2025-08-15 | Stop reason: SURG

## 2025-08-15 RX ORDER — PROCHLORPERAZINE 25 MG
25 SUPPOSITORY, RECTAL RECTAL EVERY 12 HOURS PRN
Status: DISCONTINUED | OUTPATIENT
Start: 2025-08-15 | End: 2025-08-16 | Stop reason: HOSPADM

## 2025-08-15 RX ORDER — FENTANYL CITRATE 50 UG/ML
INJECTION, SOLUTION INTRAMUSCULAR; INTRAVENOUS
Status: COMPLETED
Start: 2025-08-15 | End: 2025-08-15

## 2025-08-15 RX ORDER — GABAPENTIN 300 MG/1
600 CAPSULE ORAL ONCE
Status: COMPLETED | OUTPATIENT
Start: 2025-08-15 | End: 2025-08-15

## 2025-08-15 RX ORDER — ONDANSETRON 4 MG/1
4 TABLET, ORALLY DISINTEGRATING ORAL EVERY 6 HOURS PRN
Status: DISCONTINUED | OUTPATIENT
Start: 2025-08-15 | End: 2025-08-16 | Stop reason: HOSPADM

## 2025-08-15 RX ORDER — DIPHENHYDRAMINE HCL 25 MG
25 CAPSULE ORAL NIGHTLY PRN
Status: DISCONTINUED | OUTPATIENT
Start: 2025-08-15 | End: 2025-08-16 | Stop reason: HOSPADM

## 2025-08-15 RX ORDER — DOCUSATE SODIUM 100 MG/1
100 CAPSULE, LIQUID FILLED ORAL 2 TIMES DAILY
Status: DISCONTINUED | OUTPATIENT
Start: 2025-08-15 | End: 2025-08-16 | Stop reason: HOSPADM

## 2025-08-15 RX ORDER — ROCURONIUM BROMIDE 10 MG/ML
INJECTION, SOLUTION INTRAVENOUS AS NEEDED
Status: DISCONTINUED | OUTPATIENT
Start: 2025-08-15 | End: 2025-08-15 | Stop reason: SURG

## 2025-08-15 RX ORDER — DEXAMETHASONE SODIUM PHOSPHATE 4 MG/ML
INJECTION, SOLUTION INTRA-ARTICULAR; INTRALESIONAL; INTRAMUSCULAR; INTRAVENOUS; SOFT TISSUE AS NEEDED
Status: DISCONTINUED | OUTPATIENT
Start: 2025-08-15 | End: 2025-08-15 | Stop reason: SURG

## 2025-08-15 RX ORDER — MAGNESIUM HYDROXIDE 1200 MG/15ML
LIQUID ORAL AS NEEDED
Status: DISCONTINUED | OUTPATIENT
Start: 2025-08-15 | End: 2025-08-15 | Stop reason: HOSPADM

## 2025-08-15 RX ORDER — DEXMEDETOMIDINE HYDROCHLORIDE 4 UG/ML
INJECTION, SOLUTION INTRAVENOUS AS NEEDED
Status: DISCONTINUED | OUTPATIENT
Start: 2025-08-15 | End: 2025-08-15 | Stop reason: SURG

## 2025-08-15 RX ORDER — PROCHLORPERAZINE EDISYLATE 5 MG/ML
5 INJECTION INTRAMUSCULAR; INTRAVENOUS EVERY 6 HOURS PRN
Status: DISCONTINUED | OUTPATIENT
Start: 2025-08-15 | End: 2025-08-16 | Stop reason: HOSPADM

## 2025-08-15 RX ORDER — ACETAMINOPHEN 500 MG
1000 TABLET ORAL EVERY 8 HOURS PRN
Qty: 100 TABLET | Refills: 0 | Status: SHIPPED | OUTPATIENT
Start: 2025-08-15

## 2025-08-15 RX ORDER — SODIUM CHLORIDE, SODIUM LACTATE, POTASSIUM CHLORIDE, CALCIUM CHLORIDE 600; 310; 30; 20 MG/100ML; MG/100ML; MG/100ML; MG/100ML
INJECTION, SOLUTION INTRAVENOUS CONTINUOUS PRN
Status: DISCONTINUED | OUTPATIENT
Start: 2025-08-15 | End: 2025-08-15 | Stop reason: SURG

## 2025-08-15 RX ORDER — GLYCOPYRROLATE 0.2 MG/ML
INJECTION INTRAMUSCULAR; INTRAVENOUS AS NEEDED
Status: DISCONTINUED | OUTPATIENT
Start: 2025-08-15 | End: 2025-08-15 | Stop reason: SURG

## 2025-08-15 RX ORDER — ONDANSETRON 2 MG/ML
4 INJECTION INTRAMUSCULAR; INTRAVENOUS EVERY 6 HOURS PRN
Status: DISCONTINUED | OUTPATIENT
Start: 2025-08-15 | End: 2025-08-16 | Stop reason: HOSPADM

## 2025-08-15 RX ORDER — ACETAMINOPHEN 500 MG
1000 TABLET ORAL EVERY 8 HOURS
Status: DISCONTINUED | OUTPATIENT
Start: 2025-08-15 | End: 2025-08-16 | Stop reason: HOSPADM

## 2025-08-15 RX ORDER — EPHEDRINE SULFATE 50 MG/ML
INJECTION INTRAVENOUS AS NEEDED
Status: DISCONTINUED | OUTPATIENT
Start: 2025-08-15 | End: 2025-08-15 | Stop reason: SURG

## 2025-08-15 RX ADMIN — FENTANYL CITRATE 50 MCG: 50 INJECTION, SOLUTION INTRAMUSCULAR; INTRAVENOUS at 13:31

## 2025-08-15 RX ADMIN — SODIUM CHLORIDE 2000 MG: 900 INJECTION INTRAVENOUS at 10:22

## 2025-08-15 RX ADMIN — GLYCOPYRROLATE 0.2 MG: 0.2 INJECTION, SOLUTION INTRAMUSCULAR; INTRAVENOUS at 10:33

## 2025-08-15 RX ADMIN — ONDANSETRON 4 MG: 2 INJECTION INTRAMUSCULAR; INTRAVENOUS at 14:20

## 2025-08-15 RX ADMIN — FENTANYL CITRATE 100 MCG: 50 INJECTION, SOLUTION INTRAMUSCULAR; INTRAVENOUS at 10:16

## 2025-08-15 RX ADMIN — EPHEDRINE SULFATE 10 MG: 50 INJECTION INTRAVENOUS at 10:30

## 2025-08-15 RX ADMIN — HYDROMORPHONE HYDROCHLORIDE 0.5 MG: 1 INJECTION, SOLUTION INTRAMUSCULAR; INTRAVENOUS; SUBCUTANEOUS at 13:00

## 2025-08-15 RX ADMIN — HYDROMORPHONE HYDROCHLORIDE 0.5 MG: 1 INJECTION, SOLUTION INTRAMUSCULAR; INTRAVENOUS; SUBCUTANEOUS at 12:41

## 2025-08-15 RX ADMIN — DEXMEDETOMIDINE HYDROCHLORIDE IN 0.9% SODIUM CHLORIDE 8 MCG: 4 INJECTION INTRAVENOUS at 11:00

## 2025-08-15 RX ADMIN — Medication 500 MG: at 20:27

## 2025-08-15 RX ADMIN — LIDOCAINE HYDROCHLORIDE 50 MG: 10 INJECTION, SOLUTION EPIDURAL; INFILTRATION; INTRACAUDAL; PERINEURAL at 10:16

## 2025-08-15 RX ADMIN — DEXAMETHASONE SODIUM PHOSPHATE 4 MG: 4 INJECTION, SOLUTION INTRAMUSCULAR; INTRAVENOUS at 10:26

## 2025-08-15 RX ADMIN — NEBIVOLOL 2.5 MG: 2.5 TABLET ORAL at 20:27

## 2025-08-15 RX ADMIN — SODIUM CHLORIDE, POTASSIUM CHLORIDE, SODIUM LACTATE AND CALCIUM CHLORIDE: 600; 310; 30; 20 INJECTION, SOLUTION INTRAVENOUS at 11:57

## 2025-08-15 RX ADMIN — GABAPENTIN 600 MG: 300 CAPSULE ORAL at 09:28

## 2025-08-15 RX ADMIN — ONDANSETRON 4 MG: 2 INJECTION INTRAMUSCULAR; INTRAVENOUS at 11:57

## 2025-08-15 RX ADMIN — ACETAMINOPHEN 1000 MG: 500 TABLET, FILM COATED ORAL at 09:28

## 2025-08-15 RX ADMIN — PROCHLORPERAZINE EDISYLATE 5 MG: 5 INJECTION INTRAMUSCULAR; INTRAVENOUS at 16:43

## 2025-08-15 RX ADMIN — PROPOFOL 200 MG: 10 INJECTION, EMULSION INTRAVENOUS at 10:16

## 2025-08-15 RX ADMIN — SODIUM CHLORIDE, SODIUM LACTATE, POTASSIUM CHLORIDE, CALCIUM CHLORIDE 125 ML/HR: 20; 30; 600; 310 INJECTION, SOLUTION INTRAVENOUS at 09:28

## 2025-08-15 RX ADMIN — ACETAMINOPHEN 1000 MG: 500 TABLET, FILM COATED ORAL at 15:50

## 2025-08-15 RX ADMIN — PHENAZOPYRIDINE 200 MG: 100 TABLET ORAL at 09:28

## 2025-08-15 RX ADMIN — FAMOTIDINE 20 MG: 20 TABLET, FILM COATED ORAL at 09:28

## 2025-08-15 RX ADMIN — ROCURONIUM BROMIDE 50 MG: 10 INJECTION INTRAVENOUS at 10:17

## 2025-08-15 RX ADMIN — DOCUSATE SODIUM 100 MG: 100 CAPSULE, LIQUID FILLED ORAL at 20:27

## 2025-08-15 RX ADMIN — DEXMEDETOMIDINE HYDROCHLORIDE IN 0.9% SODIUM CHLORIDE 8 MCG: 4 INJECTION INTRAVENOUS at 11:13

## 2025-08-15 RX ADMIN — SODIUM CHLORIDE, POTASSIUM CHLORIDE, SODIUM LACTATE AND CALCIUM CHLORIDE: 600; 310; 30; 20 INJECTION, SOLUTION INTRAVENOUS at 10:09

## 2025-08-15 RX ADMIN — PROPOFOL 25 MCG/KG/MIN: 10 INJECTION, EMULSION INTRAVENOUS at 10:30

## 2025-08-15 RX ADMIN — SUGAMMADEX 200 MG: 100 INJECTION, SOLUTION INTRAVENOUS at 12:00

## 2025-08-15 RX ADMIN — POTASSIUM CHLORIDE, DEXTROSE MONOHYDRATE AND SODIUM CHLORIDE 100 ML/HR: 150; 5; 450 INJECTION, SOLUTION INTRAVENOUS at 16:43

## 2025-08-16 VITALS
TEMPERATURE: 96 F | SYSTOLIC BLOOD PRESSURE: 132 MMHG | RESPIRATION RATE: 17 BRPM | DIASTOLIC BLOOD PRESSURE: 59 MMHG | HEART RATE: 57 BPM | OXYGEN SATURATION: 93 %

## 2025-08-16 LAB
ANION GAP SERPL CALCULATED.3IONS-SCNC: 8.7 MMOL/L (ref 5–15)
BUN SERPL-MCNC: 6.6 MG/DL (ref 8–23)
BUN/CREAT SERPL: 8.7 (ref 7–25)
CALCIUM SPEC-SCNC: 8.8 MG/DL (ref 8.6–10.5)
CHLORIDE SERPL-SCNC: 105 MMOL/L (ref 98–107)
CO2 SERPL-SCNC: 23.3 MMOL/L (ref 22–29)
CREAT SERPL-MCNC: 0.76 MG/DL (ref 0.57–1)
DEPRECATED RDW RBC AUTO: 44.6 FL (ref 37–54)
EGFRCR SERPLBLD CKD-EPI 2021: 84.9 ML/MIN/1.73
ERYTHROCYTE [DISTWIDTH] IN BLOOD BY AUTOMATED COUNT: 13.7 % (ref 12.3–15.4)
GLUCOSE BLDC GLUCOMTR-MCNC: 120 MG/DL (ref 70–130)
GLUCOSE SERPL-MCNC: 133 MG/DL (ref 65–99)
HCT VFR BLD AUTO: 38.5 % (ref 34–46.6)
HGB BLD-MCNC: 12.1 G/DL (ref 12–15.9)
MCH RBC QN AUTO: 27.7 PG (ref 26.6–33)
MCHC RBC AUTO-ENTMCNC: 31.4 G/DL (ref 31.5–35.7)
MCV RBC AUTO: 88.1 FL (ref 79–97)
PLATELET # BLD AUTO: 252 10*3/MM3 (ref 140–450)
PMV BLD AUTO: 9.2 FL (ref 6–12)
POTASSIUM SERPL-SCNC: 4.3 MMOL/L (ref 3.5–5.2)
RBC # BLD AUTO: 4.37 10*6/MM3 (ref 3.77–5.28)
SODIUM SERPL-SCNC: 137 MMOL/L (ref 136–145)
WBC NRBC COR # BLD AUTO: 13.02 10*3/MM3 (ref 3.4–10.8)

## 2025-08-16 PROCEDURE — 85027 COMPLETE CBC AUTOMATED: CPT | Performed by: OBSTETRICS & GYNECOLOGY

## 2025-08-16 PROCEDURE — 80048 BASIC METABOLIC PNL TOTAL CA: CPT | Performed by: OBSTETRICS & GYNECOLOGY

## 2025-08-16 PROCEDURE — 82948 REAGENT STRIP/BLOOD GLUCOSE: CPT

## 2025-08-16 RX ADMIN — NEBIVOLOL 2.5 MG: 2.5 TABLET ORAL at 08:27

## 2025-08-16 RX ADMIN — ACETAMINOPHEN 1000 MG: 500 TABLET, FILM COATED ORAL at 00:16

## 2025-08-16 RX ADMIN — DOCUSATE SODIUM 100 MG: 100 CAPSULE, LIQUID FILLED ORAL at 08:27

## 2025-08-16 RX ADMIN — ACETAMINOPHEN 1000 MG: 500 TABLET, FILM COATED ORAL at 08:28

## 2025-08-16 RX ADMIN — Medication 500 MG: at 08:27

## 2025-08-16 RX ADMIN — POTASSIUM CHLORIDE, DEXTROSE MONOHYDRATE AND SODIUM CHLORIDE 100 ML/HR: 150; 5; 450 INJECTION, SOLUTION INTRAVENOUS at 03:29

## 2025-08-28 ENCOUNTER — OFFICE VISIT (OUTPATIENT)
Dept: OBSTETRICS AND GYNECOLOGY | Facility: CLINIC | Age: 70
End: 2025-08-28
Payer: MEDICARE

## 2025-08-28 VITALS
SYSTOLIC BLOOD PRESSURE: 124 MMHG | BODY MASS INDEX: 33.9 KG/M2 | DIASTOLIC BLOOD PRESSURE: 72 MMHG | WEIGHT: 198.6 LBS | HEIGHT: 64 IN

## 2025-08-28 DIAGNOSIS — Z48.89 POSTOPERATIVE VISIT: Primary | ICD-10-CM

## 2025-08-28 PROCEDURE — 99024 POSTOP FOLLOW-UP VISIT: CPT | Performed by: OBSTETRICS & GYNECOLOGY

## (undated) DEVICE — SCRB SURG BACTOSHIELD CHG 4PCT 4OZ

## (undated) DEVICE — TUBING, SUCTION, 1/4" X 10', STRAIGHT: Brand: MEDLINE

## (undated) DEVICE — ST TBG CONN PNEUMOCLEAR EVAC SMOKE HEAT/HUMID

## (undated) DEVICE — TRENDELENBURG WINGPAD POSITIONING KIT DELUXE - WITHOUT BODY STRAP: Brand: SOULE MEDICAL

## (undated) DEVICE — MICRO HVTSA, 0.5G AND HVTSA SOURCEMARK PRODUCT CODE M1206 AND M1206-01: Brand: EXOFIN MICRO HVTSA, 0.5G

## (undated) DEVICE — ENDOCUT SCISSOR TIP, DISPOSABLE: Brand: RENEW

## (undated) DEVICE — MARYLAND JAW LAPAROSCOPIC SEALER/DIVIDER COATED: Brand: LIGASURE

## (undated) DEVICE — APPL CHLORAPREP TINTED 26ML TEAL

## (undated) DEVICE — ANTIBACTERIAL UNDYED BRAIDED (POLYGLACTIN 910), SYNTHETIC ABSORBABLE SUTURE: Brand: COATED VICRYL

## (undated) DEVICE — ENDOPATH XCEL BLADELESS TROCARS WITH STABILITY SLEEVES: Brand: ENDOPATH XCEL

## (undated) DEVICE — ANTIBACTERIAL VIOLET BRAIDED (POLYGLACTIN 910), SYNTHETIC ABSORBABLE SURGICAL SUTURE: Brand: COATED VICRYL

## (undated) DEVICE — SYR LUERLOK 50ML

## (undated) DEVICE — PK LAP HYST 10

## (undated) DEVICE — UNDERGLV SURG BIOGEL INDICATOR LTX PF 7

## (undated) DEVICE — Device

## (undated) DEVICE — GLV SURG SENSICARE PI MIC PF SZ6.5 LF STRL